# Patient Record
Sex: FEMALE | Race: WHITE | NOT HISPANIC OR LATINO | Employment: UNEMPLOYED | URBAN - METROPOLITAN AREA
[De-identification: names, ages, dates, MRNs, and addresses within clinical notes are randomized per-mention and may not be internally consistent; named-entity substitution may affect disease eponyms.]

---

## 2019-05-08 ENCOUNTER — OFFICE VISIT (OUTPATIENT)
Dept: URGENT CARE | Facility: CLINIC | Age: 44
End: 2019-05-08
Payer: COMMERCIAL

## 2019-05-08 VITALS
DIASTOLIC BLOOD PRESSURE: 82 MMHG | RESPIRATION RATE: 18 BRPM | HEART RATE: 95 BPM | SYSTOLIC BLOOD PRESSURE: 124 MMHG | OXYGEN SATURATION: 100 % | TEMPERATURE: 97.8 F | HEIGHT: 67 IN | WEIGHT: 148.6 LBS | BODY MASS INDEX: 23.32 KG/M2

## 2019-05-08 DIAGNOSIS — B30.9 ACUTE VIRAL CONJUNCTIVITIS OF RIGHT EYE: Primary | ICD-10-CM

## 2019-05-08 PROCEDURE — 99213 OFFICE O/P EST LOW 20 MIN: CPT | Performed by: FAMILY MEDICINE

## 2019-05-08 RX ORDER — DEXTROAMPHETAMINE SACCHARATE, AMPHETAMINE ASPARTATE MONOHYDRATE, DEXTROAMPHETAMINE SULFATE AND AMPHETAMINE SULFATE 5; 5; 5; 5 MG/1; MG/1; MG/1; MG/1
CAPSULE, EXTENDED RELEASE ORAL
Refills: 0 | COMMUNITY
Start: 2019-04-25

## 2019-05-08 RX ORDER — NORGESTREL-ETHINYL ESTRADIOL 0.3-0.03MG
1 TABLET ORAL DAILY
Refills: 3 | COMMUNITY
Start: 2019-03-23

## 2019-05-08 RX ORDER — POLYDIMETHYLSILOXANES/SILICON
GEL (GRAM) TOPICAL
Refills: 3 | COMMUNITY
Start: 2019-03-08 | End: 2020-09-14 | Stop reason: ALTCHOICE

## 2019-05-08 RX ORDER — FLUOXETINE 10 MG/1
30 CAPSULE ORAL DAILY
Refills: 3 | COMMUNITY
Start: 2019-03-19

## 2019-05-08 RX ORDER — BUPROPION HCL 300 MG
TABLET, EXTENDED RELEASE 24 HR ORAL
Refills: 0 | COMMUNITY
Start: 2019-04-29

## 2019-05-08 RX ORDER — KETOTIFEN FUMARATE 0.35 MG/ML
1 SOLUTION/ DROPS OPHTHALMIC 2 TIMES DAILY PRN
Qty: 5 ML | Refills: 0 | Status: SHIPPED | OUTPATIENT
Start: 2019-05-08 | End: 2020-09-14 | Stop reason: ALTCHOICE

## 2019-07-23 ENCOUNTER — OFFICE VISIT (OUTPATIENT)
Dept: URGENT CARE | Facility: CLINIC | Age: 44
End: 2019-07-23
Payer: COMMERCIAL

## 2019-07-23 VITALS
SYSTOLIC BLOOD PRESSURE: 114 MMHG | WEIGHT: 150.4 LBS | BODY MASS INDEX: 23.61 KG/M2 | TEMPERATURE: 98.1 F | DIASTOLIC BLOOD PRESSURE: 74 MMHG | HEIGHT: 67 IN | RESPIRATION RATE: 16 BRPM | OXYGEN SATURATION: 100 % | HEART RATE: 86 BPM

## 2019-07-23 DIAGNOSIS — H10.31 ACUTE CONJUNCTIVITIS OF RIGHT EYE, UNSPECIFIED ACUTE CONJUNCTIVITIS TYPE: Primary | ICD-10-CM

## 2019-07-23 PROCEDURE — 99213 OFFICE O/P EST LOW 20 MIN: CPT | Performed by: PHYSICIAN ASSISTANT

## 2019-07-23 RX ORDER — TOBRAMYCIN 3 MG/ML
1 SOLUTION/ DROPS OPHTHALMIC
Qty: 5 ML | Refills: 0 | Status: SHIPPED | COMMUNITY
Start: 2019-07-23 | End: 2019-08-15 | Stop reason: ALTCHOICE

## 2019-07-23 NOTE — PATIENT INSTRUCTIONS
Conjunctivitis   WHAT YOU SHOULD KNOW:   Conjunctivitis, or pink eye, is inflammation of your conjunctiva  The conjunctiva is a thin tissue that covers the front of your eye and the back of your eyelids  The conjunctiva helps protect your eye and keep it moist         INSTRUCTIONS:   Medicines:   · Allergy medicine: This medicine helps decrease itchy, red, swollen eyes caused by allergies  It may be given as a pill, eye drops, or nasal spray  · Antibiotics:  You will need antibiotics if your conjunctivitis is caused by bacteria  This medicine may be given as eye drops or eye ointment  · Steroid medicine: This medicine helps decrease inflammation  It may be given as a pill, eye drops, or nasal spray  · Take your medicine as directed  Call your healthcare provider if you think your medicine is not helping or if you have side effects  Tell him if you are allergic to any medicine  Keep a list of the medicines, vitamins, and herbs you take  Include the amounts, and when and why you take them  Bring the list or the pill bottles to follow-up visits  Carry your medicine list with you in case of an emergency  Follow up with your primary healthcare provider as directed: You may need to return for more tests on your eyes  These will help your primary healthcare provider check for eye damage  Write down your questions so you remember to ask them during your visits  Avoid the spread of conjunctivitis:   · Wash your hands often:  Wash your hands before you touch your eyes  Also wash your hands before you prepare or eat food and after you use the bathroom or change a diaper  · Avoid allergens:  Try to avoid the things that cause your allergies, such as pets, dust, or grass  · Avoid contact:  Do not share towels or washcloths  Try to stay away from others as much as possible  Ask when you can return to work or school  · Throw away eye makeup:  Throw away mascara and other eye makeup    Manage your symptoms:  · Apply a cool compress:  Wet a washcloth with cold water and place it on your eye  This will help decrease swelling  · Use eye drops:  Eye drops, or artificial tears, can be bought without a doctor's order  They help keep your eye moist     · Do not wear contact lenses: They can irritate your eye  Throw away the pair you are using and ask when you can wear them again  Use a new pair of lenses when your primary healthcare provider says it is okay  · Flush your eye:  You may need to flush your eye with saline to help decrease your symptoms  Ask for more information on how to flush your eye  Contact your primary healthcare provider if:   · Your eyesight becomes blurry  · You have tiny bumps or spots of blood on your eye  · You have questions or concerns about your condition or care  Return to the emergency department if:   · The swelling in your eye gets worse, even after treatment  · Your vision suddenly becomes worse or you cannot see at all  · Your eye begins to bleed  © 2014 [address] is for End User's use only and may not be sold, redistributed or otherwise used for commercial purposes  All illustrations and images included in CareNotes® are the copyrighted property of [employer]  or [employer]  The above information is an  only  It is not intended as medical advice for individual conditions or treatments  Talk to your doctor, nurse or pharmacist before following any medical regimen to see if it is safe and effective for you  Tobramycin (Into the eye)   Tobramycin (toe-bra-MYE-sin)  Treats eye infections  Belongs to a class of drugs called antibiotics  Brand Name(s): Tobrex   There may be other brand names for this medicine  When This Medicine Should Not Be Used: You should not use this medicine if you have had an allergic reaction to tobramycin or other related medicines, such as gentamicin (Garamycin®)  How to Use This Medicine:   Ointment, Drop  · Your doctor will tell you how much of this medicine to use and how often  Do not use more medicine or use it more often than your doctor tells you to  This medicine is not for long-term use  · Wash your hands before and after using the medicine  · Shake the eye drops well just before each use  · Lie down or tilt your head back  With your index finger, pull down the lower lid of your eye to form a pocket  · To use the eye drops: Hold the dropper close to your eye with the other hand  Drop the correct number of drops into the pocket made between your lower lid and eyeball  Gently close your eyes  Place your index finger over the inner corner of your eye for 1 minute  Do not rinse or wipe the dropper or allow it to touch anything, including your eye  Put the cap on the bottle right away  Keep the bottle upright when you are not using it  · To use the ointment: Hold the tip of the tube close to your eye with the other hand  Avoid touching the tip of the tube to your eye or finger  Squeeze a ribbon of ointment into the pocket between your lower lid and eyeball  Close your eyes for 1 to 2 minutes  Wipe the tip with a clean tissue and close the tube tightly  Keep the tube tightly closed when you are not using it  If a dose is missed:   · If you miss a dose or forget to use your medicine, use it as soon as you can  If it is almost time for your next dose, wait until then to use the medicine and skip the missed dose  · Do not use extra medicine to make up for a missed dose  How to Store and Dispose of This Medicine:   · Store the medicine at room temperature, away from heat and direct light  · Keep all medicine out of the reach of children and never share your medicine with anyone  Drugs and Foods to Avoid:   Ask your doctor or pharmacist before using any other medicine, including over-the-counter medicines, vitamins, and herbal products    · Make sure your doctor knows if you are using any other products for the eye  Warnings While Using This Medicine:   · Check with your doctor if your condition worsens, or does not get better while using tobramycin  Possible Side Effects While Using This Medicine:   Call your doctor right away if you notice any of these side effects:  · Eye irritation that was not there before using the medicine  If you notice these less serious side effects, talk with your doctor:   · Burning or stinging of the eye, tearing  · Blurred vision is common for the first few minutes after using  If it continues, talk with your doctor  If you notice other side effects that you think are caused by this medicine, tell your doctor  Call your doctor for medical advice about side effects  You may report side effects to FDA at 3-007-FDA-8532  © 2017 [address]  Information is for End User's use only and may not be sold, redistributed or otherwise used for commercial purposes  The above information is an  only  It is not intended as medical advice for individual conditions or treatments  Talk to your doctor, nurse or pharmacist before following any medical regimen to see if it is safe and effective for you  Conjunctivitis:   -There was no corneal abrasion seen on exam with fluorescin dye    -We discussed that as there is conjunctival erythema and mild discharge seen on exam this is likely a conjunctivitis  Will treat with Tobramycin eye drops to be applied  as directed  -Use saline eye drops and cool compress for comfort   -Avoid makeup until your symptoms improve   -If your symptoms worsen see your Eye Doctor immediately

## 2019-07-23 NOTE — PROGRESS NOTES
[facility]        NAME: Collins Almaraz is a 53 y o  female  : 1975    MRN: [id number]  DATE: 2019  TIME: 10:14 AM    Assessment and Plan   Acute conjunctivitis of right eye, unspecified acute conjunctivitis type [H10 31]  1  Acute conjunctivitis of right eye, unspecified acute conjunctivitis type  tobramycin (TOBREX) 0 3 % SOLN         Patient Instructions   Conjunctivitis:   -There was no corneal abrasion seen on exam with fluorescin dye    -We discussed that as there is conjunctival erythema and mild discharge seen on exam this is likely a conjunctivitis  Will treat with Tobramycin eye drops to be applied  as directed  -Use saline eye drops and cool compress for comfort   -Avoid makeup until your symptoms improve   -If your symptoms worsen see your Eye Doctor immediately  Follow up with PCP in 3-5 days  Proceed to  ER if symptoms worsen  Chief Complaint     Chief Complaint   Patient presents with    Eye Problem     pt here for a right eye issues, pt states on going since yesterday,  Pt states her eye feels  gritty,  red,  crusty in the corner this morning  History of Present Illness       Collins Almaraz is a 53year old female who presents today for right eye irritation  The patient states that yesterday she began to experience a foreign body sensation in her eye, she states that she applied allergy eye drops and used normal saline to flush the eye  She states that she has conjunctival erythema and further irritation when she woke up this morning  She denies fever, chills, discharge, exudate  She denies known trauma to the eye  She denies sick contacts or recent travel  She denies visual changes, photophobia, headache, dizziness  The patient denies contact lens use  Review of Systems   Review of Systems   Constitutional: Negative for activity change, appetite change, chills, diaphoresis, fatigue and fever     HENT: Negative for congestion, ear discharge, ear pain, facial swelling, hearing loss, postnasal drip, rhinorrhea, sinus pressure, sinus pain, sneezing, sore throat, tinnitus, trouble swallowing and voice change  Eyes: Positive for redness  Negative for photophobia, pain, discharge, itching and visual disturbance  Respiratory: Negative for cough, chest tightness, shortness of breath, wheezing and stridor  Cardiovascular: Negative for chest pain, palpitations and leg swelling  Gastrointestinal: Negative for abdominal pain, diarrhea, nausea and vomiting  Musculoskeletal: Negative for arthralgias, joint swelling, myalgias, neck pain and neck stiffness  Skin: Negative for rash  Allergic/Immunologic: Negative for immunocompromised state  Neurological: Negative for dizziness, weakness, light-headedness, numbness and headaches  Hematological: Negative for adenopathy           Current Medications       Current Outpatient Medications:     amphetamine-dextroamphetamine (ADDERALL XR) 20 MG 24 hr capsule, , Disp: , Rfl: 0    CRYSELLE-28 0 3-30 MG-MCG per tablet, Take 1 tablet by mouth daily, Disp: , Rfl: 3    FLUoxetine (PROzac) 10 mg capsule, Take 30 mg by mouth daily, Disp: , Rfl: 3    Scar Treatment Products (RECEDO) GEL, APPLY TO AFFECTED SCAR TWICE A DAY, Disp: , Rfl: 3    WELLBUTRIN  MG 24 hr tablet, , Disp: , Rfl: 0    ketotifen (ZADITOR) 0 025 % ophthalmic solution, Administer 1 drop to the right eye 2 (two) times a day as needed (Irritation) (Patient not taking: Reported on 7/23/2019), Disp: 5 mL, Rfl: 0    tobramycin (TOBREX) 0 3 % SOLN, Administer 1 drop to the right eye every 4 (four) hours while awake, Disp: 5 mL, Rfl: 0    Current Allergies     Allergies as of 07/23/2019    (No Known Allergies)            The following portions of the patient's history were reviewed and updated as appropriate: allergies, current medications, past family history, past medical history, past social history, past surgical history and problem list  Past Medical History:   Diagnosis Date    Depression        Past Surgical History:   Procedure Laterality Date    NO PAST SURGERIES         Family History   Problem Relation Age of Onset    Hypertension Mother     Diabetes Father          Medications have been verified  Objective   /74 (BP Location: Right arm, Patient Position: Sitting, Cuff Size: Standard)   Pulse 86   Temp 98 1 °F (36 7 °C) (Tympanic)   Resp 16   Ht 5' 7" (1 702 m)   Wt 68 2 kg (150 lb 6 4 oz)   SpO2 100%   BMI 23 56 kg/m²        Physical Exam     Physical Exam   Constitutional: She is oriented to person, place, and time  She appears well-developed and well-nourished  No distress  HENT:   Head: Normocephalic and atraumatic  Right Ear: Hearing, tympanic membrane, external ear and ear canal normal    Left Ear: Hearing, tympanic membrane, external ear and ear canal normal    Nose: Nose normal  No mucosal edema or rhinorrhea  Right sinus exhibits no maxillary sinus tenderness and no frontal sinus tenderness  Left sinus exhibits no maxillary sinus tenderness and no frontal sinus tenderness  Mouth/Throat: Uvula is midline, oropharynx is clear and moist and mucous membranes are normal  No uvula swelling  No oropharyngeal exudate, posterior oropharyngeal edema, posterior oropharyngeal erythema or tonsillar abscesses  Eyes: Pupils are equal, round, and reactive to light  EOM are normal  Right eye exhibits discharge  Right eye exhibits no chemosis, no exudate and no hordeolum  Foreign body (eyelash successfully removed ) present in the right eye  Left eye exhibits no chemosis, no discharge, no exudate and no hordeolum  No foreign body present in the left eye  Right conjunctiva is injected  Right conjunctiva has no hemorrhage  Left conjunctiva is not injected  Left conjunctiva has no hemorrhage  Right eye exhibits normal extraocular motion and no nystagmus  Left eye exhibits normal extraocular motion and no nystagmus  Right pupil is round and reactive  Left pupil is round and reactive  Pupils are equal    Slit lamp exam:       The right eye shows foreign body (eyelash removed )  The right eye shows no corneal abrasion, no corneal flare, no corneal ulcer, no hyphema, no hypopyon, no fluorescein uptake and no anterior chamber bulge  Neck: Normal range of motion  Neck supple  Cardiovascular: Normal rate, regular rhythm, S1 normal and S2 normal  Exam reveals no gallop, no S3, no S4, no distant heart sounds and no friction rub  No murmur heard  Pulmonary/Chest: No accessory muscle usage  No tachypnea and no bradypnea  No respiratory distress  She has no decreased breath sounds  She has no wheezes  She has no rhonchi  She has no rales  Lymphadenopathy:     She has no cervical adenopathy  Neurological: She is alert and oriented to person, place, and time  Skin: She is not diaphoretic  Psychiatric: She has a normal mood and affect  Her behavior is normal    Nursing note and vitals reviewed

## 2019-08-15 ENCOUNTER — APPOINTMENT (OUTPATIENT)
Dept: RADIOLOGY | Facility: CLINIC | Age: 44
End: 2019-08-15
Payer: COMMERCIAL

## 2019-08-15 ENCOUNTER — OFFICE VISIT (OUTPATIENT)
Dept: URGENT CARE | Facility: CLINIC | Age: 44
End: 2019-08-15
Payer: COMMERCIAL

## 2019-08-15 VITALS
HEIGHT: 67 IN | DIASTOLIC BLOOD PRESSURE: 80 MMHG | WEIGHT: 152 LBS | BODY MASS INDEX: 23.86 KG/M2 | OXYGEN SATURATION: 100 % | RESPIRATION RATE: 16 BRPM | HEART RATE: 89 BPM | SYSTOLIC BLOOD PRESSURE: 122 MMHG | TEMPERATURE: 98.3 F

## 2019-08-15 DIAGNOSIS — S69.92XA LEFT WRIST INJURY, INITIAL ENCOUNTER: ICD-10-CM
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
## 2019-08-15 DIAGNOSIS — S52.502A CLOSED FRACTURE OF DISTAL END OF LEFT RADIUS, UNSPECIFIED FRACTURE MORPHOLOGY, INITIAL ENCOUNTER: Primary | ICD-10-CM

## 2019-08-15 PROCEDURE — 73090 X-RAY EXAM OF FOREARM: CPT

## 2019-08-15 PROCEDURE — 73110 X-RAY EXAM OF WRIST: CPT

## 2019-08-15 PROCEDURE — 99213 OFFICE O/P EST LOW 20 MIN: CPT | Performed by: PHYSICIAN ASSISTANT

## 2019-08-15 NOTE — PATIENT INSTRUCTIONS
Wrist Fracture in Adults   WHAT YOU NEED TO KNOW:   A wrist fracture is a break in one or more of the bones in your wrist    DISCHARGE INSTRUCTIONS:   Return to the emergency department if:   · Your pain gets worse or does not get better after you take pain medicine  · Your cast or splint breaks, gets wet, or is damaged  · Your hand or fingers feel numb or cold  · Your hand or fingers turn white or blue  · Your splint or cast feels too tight  · You have more pain or swelling after the cast or splint is put on  Contact your healthcare provider if:   · You have a fever  · There is a foul smell or blood coming from under the cast     · You have questions or concerns about your condition or care  Medicines:   · Prescription pain medicine  may be given  Ask your healthcare provider how to take this medicine safely  Some prescription pain medicines contain acetaminophen  Do not take other medicines that contain acetaminophen without talking to your healthcare provider  Too much acetaminophen may cause liver damage  Prescription pain medicine may cause constipation  Ask your healthcare provider how to prevent or treat constipation  · NSAIDs , such as ibuprofen, help decrease swelling, pain, and fever  NSAIDs can cause stomach bleeding or kidney problems in certain people  If you take blood thinner medicine, always ask your healthcare provider if NSAIDs are safe for you  Always read the medicine label and follow directions  · Acetaminophen  decreases pain and fever  It is available without a doctor's order  Ask how much to take and how often to take it  Follow directions  Read the labels of all other medicines you are using to see if they also contain acetaminophen, or ask your doctor or pharmacist  Acetaminophen can cause liver damage if not taken correctly  Do not use more than 4 grams (4,000 milligrams) total of acetaminophen in one day  · Take your medicine as directed    Contact your healthcare provider if you think your medicine is not helping or if you have side effects  Tell him or her if you are allergic to any medicine  Keep a list of the medicines, vitamins, and herbs you take  Include the amounts, and when and why you take them  Bring the list or the pill bottles to follow-up visits  Carry your medicine list with you in case of an emergency  Self-care:   · Rest  as much as possible  Do not play contact sports until the healthcare provider says it is okay  · Apply ice  on your wrist for 15 to 20 minutes every hour or as directed  Use an ice pack, or put crushed ice in a plastic bag  Cover it with a towel before you place it on your skin  Ice helps prevent tissue damage and decreases swelling and pain  · Elevate  your wrist above the level of your heart as often as possible  This will help decrease swelling and pain  Prop your wrist on pillows or blankets to keep it elevated comfortably  Cast or splint care:   · You may take a bath or shower as directed  Do not let your cast or splint get wet  Before bathing, cover the cast or splint with 2 plastic trash bags  Tape the bags to your skin above the cast or splint to seal out the water  Keep your arm out of the water in case the bag breaks  If a plaster cast gets wet and soft, call your healthcare provider  · Check the skin around the cast or splint every day  You may put lotion on any red or sore areas  · Do not push down or lean on the cast or brace because it may break  · Do not  scratch the skin under the cast by putting a sharp or pointed object inside the cast   Go to physical therapy as directed: You may need physical therapy after your wrist heals and the cast is removed  A physical therapist can teach you exercises to help improve movement and strength and to decrease pain  Follow up with your healthcare provider or bone specialist as directed: You may need to return to have your cast removed   You may also need an x-ray to check how well the bone has healed  Write down your questions so you remember to ask them during your visits  © 2017 [address] Information is for End User's use only and may not be sold, redistributed or otherwise used for commercial purposes  All illustrations and images included in CareNotes® are the copyrighted property of [employer]  or [employer]  The above information is an  only  It is not intended as medical advice for individual conditions or treatments  Talk to your doctor, nurse or pharmacist before following any medical regimen to see if it is safe and effective for you

## 2019-08-15 NOTE — PROGRESS NOTES
Splint application  Date/Time: 8/15/2019 2:34 PM  Performed by: Diana Randall RN  Authorized by: Susan Toscano PA-C     Consent:     Consent obtained:  Verbal    Consent given by:  Patient    Risks discussed:  Discoloration, numbness, pain and swelling  Pre-procedure details:     Sensation:  Normal  Procedure details:     Laterality:  Left    Location:  Wrist    Wrist:  L wrist    Strapping: no      Supplies:  Cotton padding and Ortho-Glass  Post-procedure details:     Pain:  Improved    Sensation:  Normal    Patient tolerance of procedure:   Tolerated well, no immediate complications

## 2019-08-15 NOTE — PROGRESS NOTES
[facility]        NAME: Vikram Davis is a 53 y o  female  : 1975    MRN: [id number]  DATE: 2019  TIME: 9:49 AM    Assessment and Plan   Closed fracture of distal end of left radius, unspecified fracture morphology, initial encounter [S52 502A]  1  Closed fracture of distal end of left radius, unspecified fracture morphology, initial encounter  XR wrist 3+ vw left    XR forearm 2 vw left    Ambulatory referral to Orthopedic Surgery    Splint application   2  Fall down stairs, initial encounter  Ambulatory referral to Orthopedic Surgery         Patient Instructions     Discussed condition with pt  XR of left wrist and forearm reveals a distal radius fracture  She will be placed in splint and referred to orthopaedics  In the interim, I rec NSAIDs for pain and swelling  Follow up with PCP in 3-5 days  Proceed to  ER if symptoms worsen  Chief Complaint     Chief Complaint   Patient presents with    Wrist Injury     Pt states she fell on the steps last night at 8:30, pain with movement in left wrist ,   pain   7/10  Pt used Advil and ice  History of Present Illness       Pt presents after falling and injuring her left wrist/forearm yesterday  Around 8:30 PM, she was on the stairs, lost her balance, and fell onto an outstretched left hand to try to break her fall  Denies any other area of injury  She reports pain, swelling, bruising of the wrist/forearm  Has applied ice and taken Advil  Review of Systems   Review of Systems   Constitutional: Negative  Respiratory: Negative  Cardiovascular: Negative  Gastrointestinal: Negative  Genitourinary: Negative      Musculoskeletal:        Left wrist/forearm injury with [address] mechanism          Current Medications       Current Outpatient Medications:     amphetamine-dextroamphetamine (ADDERALL XR) 20 MG 24 hr capsule, , Disp: , Rfl: 0    CRYSELLE-28 0 3-30 MG-MCG per tablet, Take 1 tablet by mouth daily, Disp: , Rfl: 3    FLUoxetine (PROzac) 10 mg capsule, Take 30 mg by mouth daily, Disp: , Rfl: 3    ketotifen (ZADITOR) 0 025 % ophthalmic solution, Administer 1 drop to the right eye 2 (two) times a day as needed (Irritation), Disp: 5 mL, Rfl: 0    Scar Treatment Products (RECEDO) GEL, APPLY TO AFFECTED SCAR TWICE A DAY, Disp: , Rfl: 3    WELLBUTRIN  MG 24 hr tablet, , Disp: , Rfl: 0    Current Allergies     Allergies as of 08/15/2019    (No Known Allergies)            The following portions of the patient's history were reviewed and updated as appropriate: allergies, current medications, past family history, past medical history, past social history, past surgical history and problem list      Past Medical History:   Diagnosis Date    ADHD (attention deficit hyperactivity disorder)     Depression        Past Surgical History:   Procedure Laterality Date    NO PAST SURGERIES         Family History   Problem Relation Age of Onset    Hypertension Mother     Diabetes Father     No Known Problems Sister     No Known Problems Brother     No Known Problems Maternal Aunt     No Known Problems Maternal Uncle     No Known Problems Paternal Aunt     No Known Problems Paternal Uncle     No Known Problems Maternal Grandmother     No Known Problems Maternal Grandfather     No Known Problems Paternal Grandmother     No Known Problems Paternal Grandfather          Medications have been verified  Objective   /80 (BP Location: Right arm, Patient Position: Sitting, Cuff Size: Standard)   Pulse 89   Temp 98 3 °F (36 8 °C) (Tympanic)   Resp 16   Ht 5' 7" (1 702 m)   Wt 68 9 kg (152 lb)   SpO2 100%   BMI 23 81 kg/m²        Physical Exam     Physical Exam   Constitutional: She is oriented to person, place, and time  She appears well-developed and well-nourished  No distress     Musculoskeletal:   Left distal forearm volar aspect with localized STS, erythema, and TTP worsened with PROM of wrist which is limited secondary to pain/discomfort  Neurological: She is alert and oriented to person, place, and time  Psychiatric: She has a normal mood and affect  Vitals reviewed

## 2019-08-16 ENCOUNTER — OFFICE VISIT (OUTPATIENT)
Dept: OBGYN CLINIC | Facility: CLINIC | Age: 44
End: 2019-08-16
Payer: COMMERCIAL

## 2019-08-16 VITALS
WEIGHT: 152 LBS | HEART RATE: 100 BPM | DIASTOLIC BLOOD PRESSURE: 111 MMHG | SYSTOLIC BLOOD PRESSURE: 155 MMHG | BODY MASS INDEX: 23.86 KG/M2 | HEIGHT: 67 IN

## 2019-08-16 DIAGNOSIS — S52.532A CLOSED COLLES' FRACTURE OF LEFT RADIUS, INITIAL ENCOUNTER: ICD-10-CM
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
## 2019-08-16 PROCEDURE — 25600 CLTX DST RDL FX/EPHYS SEP WO: CPT | Performed by: ORTHOPAEDIC SURGERY

## 2019-08-16 PROCEDURE — 99203 OFFICE O/P NEW LOW 30 MIN: CPT | Performed by: ORTHOPAEDIC SURGERY

## 2019-08-16 NOTE — LETTER
August 16, 2019     Patient: See Harvey   YOB: 1975   Date of Visit: 8/16/2019       To Whom it May Concern:    See Harvey is under my professional care  She was seen in my office on 8/16/2019  She has a wrist injury and she cannot drive for 2 weeks  Follow up 1-2 weeks  If you have any questions or concerns, please don't hesitate to call           Sincerely,          Jose Gallo,

## 2019-08-16 NOTE — PROGRESS NOTES
Assessment/Plan:  1  Closed Colles' fracture of left radius, initial encounter  Ambulatory referral to Orthopedic Surgery   2  Fall down stairs, initial encounter  Ambulatory referral to Fred[facility] ABDULLAHI Bailonbelle Gupta has left-sided wrist pain consistent with a nondisplaced intra-articular distal radius fracture  This should heal well with conservative measures  We did place her in Exos cast today and she was instructed to leave the cast on at all times except for bathing  She will follow up in the office in 1 week for repeat evaluation and x-ray  If she remains stable she will be continued on conservative treatment for the next 4-6 weeks  Subjective:   Nilda Beasley is a 53 y o  female who presents to the office for evaluation for a left wrist injury  She tripped and fell on her outstretched left hand landing on the stairs 2 days ago  She had immediate pain and discomfort to the wrist and difficulty moving  She presented to urgent care 1 day ago where she had an x-ray showing a nondisplaced fracture of the distal radius  She was placed in a splint advised to follow up in our office today  She has pain today that is aching and throbbing and intermittent over the distal aspect of her wrist   It worsens with movement or supination improves with rest and immobilization  She has been icing  She does have some swelling  She denies any numbness and tingling into her hand or previous history of wrist fracture  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  Review of Systems   Constitutional: Negative for chills, fever and unexpected weight change  HENT: Negative for hearing loss, nosebleeds and sore throat  Eyes: Negative for pain, redness and visual disturbance  Respiratory: Negative for cough, shortness of breath and wheezing  Cardiovascular: Negative for chest pain, palpitations and leg swelling     Gastrointestinal: Negative for abdominal pain, nausea and vomiting  Endocrine: Negative for polydipsia and polyuria  Genitourinary: Negative for dysuria and hematuria  Musculoskeletal:        See HPI   Skin: Negative for rash and wound  Neurological: Negative for dizziness, numbness and headaches  Psychiatric/Behavioral: Negative for decreased concentration and suicidal ideas  The patient is not nervous/anxious            Past Medical History:   Diagnosis Date    ADHD (attention deficit hyperactivity disorder)     Depression        Past Surgical History:   Procedure Laterality Date    NO PAST SURGERIES         Family History   Problem Relation Age of Onset    Hypertension Mother     Diabetes Father     No Known Problems Sister     No Known Problems Brother     No Known Problems Maternal Aunt     No Known Problems Maternal Uncle     No Known Problems Paternal Aunt     No Known Problems Paternal Uncle     No Known Problems Maternal Grandmother     No Known Problems Maternal Grandfather     No Known Problems Paternal Grandmother     No Known Problems Paternal Grandfather        Social History     Occupational History    Not on file   Tobacco Use    Smoking status: Never Smoker    Smokeless tobacco: Never Used   Substance and Sexual Activity    Alcohol use: Yes     Frequency: Monthly or less     Drinks per session: 1 or 2    Drug use: Never    Sexual activity: Not on file         Current Outpatient Medications:     amphetamine-dextroamphetamine (ADDERALL XR) 20 MG 24 hr capsule, , Disp: , Rfl: 0    CRYSELLE-28 0 3-30 MG-MCG per tablet, Take 1 tablet by mouth daily, Disp: , Rfl: 3    FLUoxetine (PROzac) 10 mg capsule, Take 30 mg by mouth daily, Disp: , Rfl: 3    ketotifen (ZADITOR) 0 025 % ophthalmic solution, Administer 1 drop to the right eye 2 (two) times a day as needed (Irritation), Disp: 5 mL, Rfl: 0    Scar Treatment Products (RECEDO) GEL, APPLY TO AFFECTED SCAR TWICE A DAY, Disp: , Rfl: 3    WELLBUTRIN XL 300 MG 24 hr tablet, , Disp: , Rfl: 0    Allergies   Allergen Reactions    Dust Mite Extract        Objective:  Vitals:    08/16/19 1305   BP: (!) 163/116   Pulse: 86       Left Hand Exam     Tenderness   The patient is experiencing tenderness in the radial area  Range of Motion   Wrist   Extension: abnormal   Flexion: abnormal   Pronation: abnormal   Supination: abnormal     Other   Erythema: absent  Sensation: normal  Pulse: present            Physical Exam   Constitutional: She is oriented to person, place, and time  She appears well-developed and well-nourished  HENT:   Head: Normocephalic and atraumatic  Eyes: Pupils are equal, round, and reactive to light  Conjunctivae are normal    Neck: Normal range of motion  Neck supple  Cardiovascular: Normal rate and intact distal pulses  Pulmonary/Chest: Effort normal  No respiratory distress  Musculoskeletal:   As noted in HPI   Neurological: She is alert and oriented to person, place, and time  Skin: Skin is warm and dry  Psychiatric: She has a normal mood and affect  Her behavior is normal    Vitals reviewed  I have personally reviewed pertinent films in PACS and my interpretation is as follows: Three-view x-ray of the left wrist from 8/15/19  Demonstrates a nondisplaced intra-articular distal radius fracture  Fracture Treatment  Date/Time: 8/16/2019 1:18 PM  Performed by: Andry Trammell DO  Authorized by: Andry Trammell DO     Patient Location:  Clinic  Verbal consent obtained?: Yes    Consent given by:  Patient  Radiology Images displayed and confirmed  If images not available, report reviewed: Yes    Injury location:  Wrist  Location details:  Left wrist  Injury type:  Fracture  Fracture type: distal radius    Fracture type: distal radius    Local anesthesia used?: No    Manipulation performed?: No    Cast type:  Short arm  Supplies used: EXOS    Neurovascular status: Neurovascularly intact    Patient tolerance:  Patient tolerated the procedure well with no immediate complications

## 2019-08-16 NOTE — LETTER
August 16, 2019     Patient: Christiano Davenport   YOB: 1975   Date of Visit: 8/16/2019       To Whom it May Concern:    Christiano Davenport is under my professional care  She was seen in my office on 8/16/2019  Please put her gym membership on hold for 1 month due to her injury  If you have any questions or concerns, please don't hesitate to call           Sincerely,          Woodrow Peres, DO

## 2019-08-28 ENCOUNTER — APPOINTMENT (OUTPATIENT)
Dept: RADIOLOGY | Facility: CLINIC | Age: 44
End: 2019-08-28
Payer: COMMERCIAL

## 2019-08-28 ENCOUNTER — TELEPHONE (OUTPATIENT)
Dept: OBGYN CLINIC | Facility: HOSPITAL | Age: 44
End: 2019-08-28

## 2019-08-28 ENCOUNTER — OFFICE VISIT (OUTPATIENT)
Dept: OBGYN CLINIC | Facility: CLINIC | Age: 44
End: 2019-08-28

## 2019-08-28 VITALS
WEIGHT: 150 LBS | HEIGHT: 67 IN | SYSTOLIC BLOOD PRESSURE: 130 MMHG | DIASTOLIC BLOOD PRESSURE: 86 MMHG | BODY MASS INDEX: 23.54 KG/M2 | HEART RATE: 83 BPM

## 2019-08-28 DIAGNOSIS — S52.532D CLOSED COLLES' FRACTURE OF LEFT RADIUS WITH ROUTINE HEALING, SUBSEQUENT ENCOUNTER: Primary | ICD-10-CM

## 2019-08-28 DIAGNOSIS — S52.532A CLOSED COLLES' FRACTURE OF LEFT RADIUS, INITIAL ENCOUNTER: ICD-10-CM

## 2019-08-28 PROCEDURE — 99024 POSTOP FOLLOW-UP VISIT: CPT | Performed by: ORTHOPAEDIC SURGERY

## 2019-08-28 PROCEDURE — 73110 X-RAY EXAM OF WRIST: CPT

## 2019-08-28 NOTE — PROGRESS NOTES
Assessment/Plan:  1  Closed Colles' fracture of left radius with routine healing, subsequent encounter  XR wrist 3+ vw left     Endy Oneill is doing well and has a stable left wrist fracture on examination today  Her x-ray does not show any further displacement  She will continue in the Exos cast at this time  I will see her back in 3 weeks for repeat x-ray and evaluation  Subjective:   Vikram Davis is a 53 y o  female who presents to the office for follow-up for a nondisplaced left wrist fracture  She has been in a Exos cast for the last 10 days in her fracture occurred 2 weeks ago  She states that her pain is much improved and the swelling has gone down  She denies any new injury            Past Medical History:   Diagnosis Date    ADHD (attention deficit hyperactivity disorder)     Depression        Past Surgical History:   Procedure Laterality Date    NO PAST SURGERIES         Family History   Problem Relation Age of Onset    Hypertension Mother     Diabetes Father     No Known Problems Sister     No Known Problems Brother     No Known Problems Maternal Aunt     No Known Problems Maternal Uncle     No Known Problems Paternal Aunt     No Known Problems Paternal Uncle     No Known Problems Maternal Grandmother     No Known Problems Maternal Grandfather     No Known Problems Paternal Grandmother     No Known Problems Paternal Grandfather        Social History     Occupational History    Not on file   Tobacco Use    Smoking status: Never Smoker    Smokeless tobacco: Never Used   Substance and Sexual Activity    Alcohol use: Yes     Frequency: Monthly or less     Drinks per session: 1 or 2    Drug use: Never    Sexual activity: Not on file         Current Outpatient Medications:     amphetamine-dextroamphetamine (ADDERALL XR) 20 MG 24 hr capsule, , Disp: , Rfl: 0    CRYSELLE-28 0 3-30 MG-MCG per tablet, Take 1 tablet by mouth daily, Disp: , Rfl: 3    FLUoxetine (PROzac) 10 mg capsule, Take 30 mg by mouth daily, Disp: , Rfl: 3    ketotifen (ZADITOR) 0 025 % ophthalmic solution, Administer 1 drop to the right eye 2 (two) times a day as needed (Irritation), Disp: 5 mL, Rfl: 0    Scar Treatment Products (RECEDO) GEL, APPLY TO AFFECTED SCAR TWICE A DAY, Disp: , Rfl: 3    WELLBUTRIN  MG 24 hr tablet, , Disp: , Rfl: 0    Allergies   Allergen Reactions    Dust Mite Extract        Objective:  Vitals:    08/28/19 0852   BP: 130/86   Pulse: 83       Left Hand Exam     Tenderness   The patient is experiencing tenderness in the radial area  Other   Erythema: absent  Sensation: normal  Pulse: present            Physical Exam   Constitutional: She is oriented to person, place, and time  She appears well-developed and well-nourished  HENT:   Head: Normocephalic and atraumatic  Eyes: Pupils are equal, round, and reactive to light  Conjunctivae are normal    Neck: Normal range of motion  Neck supple  Cardiovascular: Normal rate and intact distal pulses  Pulmonary/Chest: Effort normal  No respiratory distress  Musculoskeletal:   As noted in HPI   Neurological: She is alert and oriented to person, place, and time  Skin: Skin is warm and dry  Psychiatric: She has a normal mood and affect  Her behavior is normal    Vitals reviewed  I have personally reviewed pertinent films in PACS and my interpretation is as follows: Three-view x-rays of the left wrist today demonstrates stable, intra-articular distal radius fracture

## 2019-08-28 NOTE — TELEPHONE ENCOUNTER
Patient advised she would like to start on the 9th (NOT 9/3/19) Is this okay? States her job is very hands on

## 2019-08-28 NOTE — TELEPHONE ENCOUNTER
Caller: Patient- Chino Yu  Call Back Number: [phone]  Provider: Dr Janay Glass    Patient has called to request for a work note  Patient would like the note to state she in under Dr Luz Alaniz care and would like to start on September 9th  Patient would like to receive a call so she may  the letter at office      Please advice, thank you

## 2019-08-28 NOTE — TELEPHONE ENCOUNTER
We discussed in the office today that she will return to work next week, not the ninth    Please write a note allowing her to return to work on 9/3/2019

## 2019-09-23 ENCOUNTER — OFFICE VISIT (OUTPATIENT)
Dept: OBGYN CLINIC | Facility: CLINIC | Age: 44
End: 2019-09-23

## 2019-09-23 ENCOUNTER — APPOINTMENT (OUTPATIENT)
Dept: RADIOLOGY | Facility: CLINIC | Age: 44
End: 2019-09-23
Payer: COMMERCIAL

## 2019-09-23 VITALS
WEIGHT: 150 LBS | DIASTOLIC BLOOD PRESSURE: 96 MMHG | SYSTOLIC BLOOD PRESSURE: 147 MMHG | HEART RATE: 97 BPM | HEIGHT: 67 IN | BODY MASS INDEX: 23.54 KG/M2

## 2019-09-23 DIAGNOSIS — M72.0 DUPUYTREN'S CONTRACTURE OF BOTH HANDS: ICD-10-CM

## 2019-09-23 DIAGNOSIS — S52.532D CLOSED COLLES' FRACTURE OF LEFT RADIUS WITH ROUTINE HEALING, SUBSEQUENT ENCOUNTER: Primary | ICD-10-CM

## 2019-09-23 DIAGNOSIS — S52.532D CLOSED COLLES' FRACTURE OF LEFT RADIUS WITH ROUTINE HEALING, SUBSEQUENT ENCOUNTER: ICD-10-CM

## 2019-09-23 PROCEDURE — 73110 X-RAY EXAM OF WRIST: CPT

## 2019-09-23 PROCEDURE — 99024 POSTOP FOLLOW-UP VISIT: CPT | Performed by: ORTHOPAEDIC SURGERY

## 2019-09-23 NOTE — LETTER
September 23, 2019     Patient: Sho Morris   YOB: 1975   Date of Visit: 9/23/2019       To Whom it May Concern:    Sho Morris is under my professional care  She was seen in my office on 9/23/2019  Please excuse her from her gym membership from 9/16/19 - 10/16/19  If you have any questions or concerns, please don't hesitate to call           Sincerely,          Bertin Johnson, DO

## 2019-09-23 NOTE — PROGRESS NOTES
Assessment/Plan:  1  Closed Colles' fracture of left radius with routine healing, subsequent encounter  XR wrist 3+ vw left   2  Dupuytren's contracture of both hands         Lashay Mckeon has a healing intra-articular distal radius fracture  I would like for her to begin with mobilization of this wrist to prevent stiffness  I also start her in physical therapy today  We did switch her out of the cast into a cock-up wrist splint that she can wear for stability when she is active  She should come out of it every day for gentle mobilization  She will continue to refrain from heavy lifting in the gym but can do all cardio exercises  I would like for her to follow up in 3-4 weeks for repeat evaluation  I will be out of the office likely at that time and would like for her to see Dr Ayde Orellana for quick x-ray and evaluation  Separately, she has a Dupuytren's contracture in both hands  I would like for her to follow-up with Dr Jose Alfredo Kim to consider Xiaflex injection  Subjective:   Riri Tom is a 53 y o  female who presents for follow-up for an intra-articular distal radius fracture in her left wrist   She has been in a Exos cast for 5 weeks and reports improvement in her pain and swelling  She denies any new injury  She states that she takes the Exos cast off occasionally for movement of her wrist and it does feel stiff but overall much improved  She also has complaint of Dupuytren's contracture in bilateral hands  She did present to another physician at one time who told her to leave this alone  She is wondering if anything can be done about this  It does bother her when lifting weights in the gym or picking up objects  Review of Systems   Constitutional: Negative for chills, fever and unexpected weight change  HENT: Negative for hearing loss, nosebleeds and sore throat  Eyes: Negative for pain, redness and visual disturbance  Respiratory: Negative for cough, shortness of breath and wheezing  Cardiovascular: Negative for chest pain, palpitations and leg swelling  Gastrointestinal: Negative for abdominal pain, nausea and vomiting  Endocrine: Negative for polydipsia and polyuria  Genitourinary: Negative for dysuria and hematuria  Musculoskeletal:        See HPI   Skin: Negative for rash and wound  Neurological: Negative for dizziness, numbness and headaches  Psychiatric/Behavioral: Negative for decreased concentration and suicidal ideas  The patient is not nervous/anxious            Past Medical History:   Diagnosis Date    ADHD (attention deficit hyperactivity disorder)     Depression        Past Surgical History:   Procedure Laterality Date    NO PAST SURGERIES         Family History   Problem Relation Age of Onset    Hypertension Mother     Diabetes Father     No Known Problems Sister     No Known Problems Brother     No Known Problems Maternal Aunt     No Known Problems Maternal Uncle     No Known Problems Paternal Aunt     No Known Problems Paternal Uncle     No Known Problems Maternal Grandmother     No Known Problems Maternal Grandfather     No Known Problems Paternal Grandmother     No Known Problems Paternal Grandfather        Social History     Occupational History    Not on file   Tobacco Use    Smoking status: Never Smoker    Smokeless tobacco: Never Used   Substance and Sexual Activity    Alcohol use: Yes     Frequency: Monthly or less     Drinks per session: 1 or 2    Drug use: Never    Sexual activity: Not on file         Current Outpatient Medications:     amphetamine-dextroamphetamine (ADDERALL XR) 20 MG 24 hr capsule, , Disp: , Rfl: 0    CRYSELLE-28 0 3-30 MG-MCG per tablet, Take 1 tablet by mouth daily, Disp: , Rfl: 3    FLUoxetine (PROzac) 10 mg capsule, Take 30 mg by mouth daily, Disp: , Rfl: 3    ketotifen (ZADITOR) 0 025 % ophthalmic solution, Administer 1 drop to the right eye 2 (two) times a day as needed (Irritation), Disp: 5 mL, Rfl: 0   Scar Treatment Products (RECEDO) GEL, APPLY TO AFFECTED SCAR TWICE A DAY, Disp: , Rfl: 3    WELLBUTRIN  MG 24 hr tablet, , Disp: , Rfl: 0    Allergies   Allergen Reactions    Dust Mite Extract        Objective:  Vitals:    09/23/19 0827   BP: 147/96   Pulse: 97       Left Hand Exam     Tenderness   Left hand tenderness location: Mild tenderness to palpation over dorsal radius at radiocarpal joint  Range of Motion   Wrist   Extension:  40 abnormal   Flexion:  70 abnormal   Pronation: normal   Supination: normal     Muscle Strength   Wrist extension: 4/5   Wrist flexion: 4/5   :  5/5     Other   Erythema: absent  Sensation: normal  Pulse: present    Comments:  Dupuytren's contracture in bilateral hands          Strength/Myotome Testing     Left Wrist/Hand   Wrist extension: 4  Wrist flexion: 4      Physical Exam   Constitutional: She is oriented to person, place, and time  She appears well-developed and well-nourished  HENT:   Head: Normocephalic and atraumatic  Eyes: Pupils are equal, round, and reactive to light  Conjunctivae are normal    Neck: Normal range of motion  Neck supple  Cardiovascular: Normal rate and intact distal pulses  Pulmonary/Chest: Effort normal  No respiratory distress  Neurological: She is alert and oriented to person, place, and time  Skin: Skin is warm and dry  Psychiatric: She has a normal mood and affect  Her behavior is normal        I have personally reviewed pertinent films in PACS and my interpretation is as follows: Three-view x-ray of the left wrist in the office today demonstrates a stable, healing intra-articular distal radius fracture

## 2019-09-26 ENCOUNTER — EVALUATION (OUTPATIENT)
Dept: PHYSICAL THERAPY | Facility: CLINIC | Age: 44
End: 2019-09-26
Payer: COMMERCIAL

## 2019-09-26 DIAGNOSIS — S52.532D CLOSED COLLES' FRACTURE OF LEFT RADIUS WITH ROUTINE HEALING, SUBSEQUENT ENCOUNTER: ICD-10-CM

## 2019-09-26 PROCEDURE — 97161 PT EVAL LOW COMPLEX 20 MIN: CPT

## 2019-09-26 NOTE — PROGRESS NOTES
PT Evaluation     Today's date: 2019  Patient name: Cheryl Harvey  : 1975  MRN: [id number]  Referring provider: Geo Luis DO  Dx:   Encounter Diagnosis     ICD-10-CM    1  Closed Colles' fracture of left radius with routine healing, subsequent encounter S52 [id number] Ambulatory referral to Physical Therapy       Start Time: 0800  Stop Time: 0845  Total time in clinic (min): 45 minutes    Assessment  Assessment details: Cheryl Harvey is a 53 y o  female who presents with pain, decreased strength, decreased ROM, decreased joint mobility and joint effusion  Due to these impairments, patient has difficulty performing ADL's, recreational activities, work-related activities, lifting/carrying, reaching  Patient's clinical presentation is consistent with their referring diagnosis of Closed Colles' fracture of left radius with routine healing, subsequent encounter Plan: Ambulatory referral to Physical Therapy  Patient has been educated in home exercise program and plan of care  Patient would benefit from skilled physical therapy services to address their aforementioned functional limitations and progress towards prior level of function and independence with home exercise program      Impairments: abnormal or restricted ROM, activity intolerance, impaired physical strength, lacks appropriate home exercise program and pain with function  Functional limitations: Unable to lift/carry with the L UEUnderstanding of Dx/Px/POC: good   Prognosis: good    Goals  Short Term Goals: Target Date 10/23/2019  1  Initiate and advance HEP  2  Increase left wrist AROM by at least 10 deg in all planes  3  Increase left UE MMT by at least 1/2 grade  4  Pt will be able to carry objects <5 lbs without increased pain      Long Term Goals: Target Date 2019  1  Indep with HEP  2  Increase left wrist AROM to [facility] in all planes  3  Increase left UE MMT to at least 4+/5 in all planes  4   Pt will be able to carry objects </=15 lbs without increased pain  5  Pt will be able to return to PLOF including resistance without pain or limitation        Plan  Patient would benefit from: skilled PT  Planned modality interventions: cryotherapy, electrical stimulation/Russian stimulation and thermotherapy: hydrocollator packs  Planned therapy interventions: joint mobilization, manual therapy, patient education, postural training, activity modification, abdominal trunk stabilization, body mechanics training, flexibility, functional ROM exercises, graded exercise, home exercise program, neuromuscular re-education, strengthening, stretching, therapeutic activities, therapeutic exercise, motor coordination training, muscle pump exercises, balance/weight bearing training and ADL training  Frequency: 3x week  Duration in weeks: 8  Plan of Care beginning date: 2019  Plan of Care expiration date: 2019  Treatment plan discussed with: patient        Subjective Evaluation    History of Present Illness  Mechanism of injury: Pt reports she fell down three stairs on August 15 and landed on an outstretched L UE  Went to Urgent Care the next day who performed x-rays (L Colles Fracture), and was referred to Dr Robert You the next day  She was given an Exos splint to be worn at all times for 4 weeks  States over that time, the pain has improved greatly as well as a marked reduction in swelling  Had a f/u with Dr Robert You 19 who took repeat x-rays (stable, healing intra-articular distal radius fracture ), gave her a cock-up splint and prescribed PT for mobility  Quality of life: good    Pain  Current pain ratin  At best pain rating: 3  At worst pain ratin (Reaching/grabbing)  Location: L Wrist  Quality: dull ache  Relieving factors: rest and ice  Exacerbated by: Reacing, grabbing light objects    Progression: improved    Social Support    Employment status: working  Hand dominance: left      Diagnostic Tests  X-ray: abnormal (As above)  Treatments  Previous treatment: immobilization  Patient Goals  Patient goals for therapy: return to sport/leisure activities, increased motion, decreased pain and increased strength  Patient goal: Return to weight lifting        Objective     Static Posture     Comments  Pt presents with a L wrist cock-up splint    Tenderness     Left Wrist/Hand   Tenderness in the distal radioulnar joint       Active Range of Motion   Left Shoulder   Normal active range of motion    Right Shoulder   Normal active range of motion    Left Elbow   Normal active range of motion    Right Elbow   Normal active range of motion    Left Wrist   Wrist flexion: 25 degrees   Wrist extension: 29 degrees   Radial deviation: 21 degrees   Ulnar deviation: 18 degrees     Right Wrist   Normal active range of motion    Passive Range of Motion     Left Wrist   Wrist flexion: 36 degrees   Wrist extension: 45 degrees   Radial deviation: 30 degrees   Ulnar deviation: 25 degrees     Right Wrist   Normal passive range of motion    Strength/Myotome Testing     Left Shoulder   Normal muscle strength    Right Shoulder   Normal muscle strength    Left Elbow   Flexion: 4  Extension: 4    Right Elbow   Flexion: 5  Extension: 5    Left Wrist/Hand   Wrist extension: 3  Wrist flexion: 3  Radial deviation: 3-  Ulnar deviation: 3     (2nd hand position)     Trial 1: 29    Trial 2: 32    Trial 3: 38    Right Wrist/Hand      (2nd hand position)     Trial 1: 56    Trial 2: 74    Trial 3: 71    Swelling     Left Wrist/Hand   Circumference wrist: 26 25 cm    Right Wrist/Hand   Circumference wrist: 25 5 cm      Flowsheet Rows      Most Recent Value   PT/OT G-Codes   Current Score  50   Projected Score  70   FOTO information reviewed  Yes   Assessment Type  Evaluation             Precautions: Standard    Specialty Daily Treatment Diary       Manual 9/26/19       PROM wrist        Wrist mobs        STM                                Exercise Diary         AROM flex/ext 10x5"ea       AROM rad/ ulnar dev 10x5"ea       Ball squeeze 10x5"       Finger abd band 10x5"                                                                                                               Modalities

## 2019-09-30 ENCOUNTER — APPOINTMENT (OUTPATIENT)
Dept: PHYSICAL THERAPY | Facility: CLINIC | Age: 44
End: 2019-09-30
Payer: COMMERCIAL

## 2019-10-01 ENCOUNTER — OFFICE VISIT (OUTPATIENT)
Dept: PHYSICAL THERAPY | Facility: CLINIC | Age: 44
End: 2019-10-01
Payer: COMMERCIAL

## 2019-10-01 DIAGNOSIS — S52.532D CLOSED COLLES' FRACTURE OF LEFT RADIUS WITH ROUTINE HEALING, SUBSEQUENT ENCOUNTER: Primary | ICD-10-CM

## 2019-10-01 PROCEDURE — 97110 THERAPEUTIC EXERCISES: CPT

## 2019-10-01 PROCEDURE — 97140 MANUAL THERAPY 1/> REGIONS: CPT

## 2019-10-01 NOTE — PROGRESS NOTES
Daily Note     Today's date: 10/1/2019  Patient name: Kat Ann  : 1975  MRN: [id number]  Referring provider: Durga Berkowitz DO  Dx:   Encounter Diagnosis   Name Primary?  Closed Colles' fracture of left radius with routine healing, subsequent encounter Yes       Start Time: 845  Stop Time: 940  Total time in clinic (min): 55 minutes    Subjective: Pt reports she has been doing well with HEP  Reports of improved wrist mobility and has been trying to remove her brace more often while at home  Pain Ratin/10    Objective: See treatment diary below  Precautions: Standard    Specialty Daily Treatment Diary       Manual 19       PROM wrist All planes       Wrist mobs Gr II-III       STM Retrograde                               Exercise Diary         AROM flex/ext 15x5"ea       AROM rad/ ulnar dev 10x5"ea       Gripper 10x5" ea digit yellow       Finger abd band        Wrist pro/sup 15x5" ea red therabar       Wrist circles x15 CW/CCW                                                                                               Modalities        MHP x8 mins pre                   Assessment: Pt reports of mild tightness/soreness with end range wrist flexion, denies pain with all other motions  Discussed carrying nothing heavier than 10 lbs at this time to prevent excessive stress to the bone  Plan: Continue per plan of care  Progress treatment as tolerated

## 2019-10-02 ENCOUNTER — APPOINTMENT (OUTPATIENT)
Dept: PHYSICAL THERAPY | Facility: CLINIC | Age: 44
End: 2019-10-02
Payer: COMMERCIAL

## 2019-10-03 ENCOUNTER — OFFICE VISIT (OUTPATIENT)
Dept: PHYSICAL THERAPY | Facility: CLINIC | Age: 44
End: 2019-10-03
Payer: COMMERCIAL

## 2019-10-03 DIAGNOSIS — S52.532D CLOSED COLLES' FRACTURE OF LEFT RADIUS WITH ROUTINE HEALING, SUBSEQUENT ENCOUNTER: Primary | ICD-10-CM

## 2019-10-03 PROCEDURE — 97110 THERAPEUTIC EXERCISES: CPT

## 2019-10-03 PROCEDURE — 97140 MANUAL THERAPY 1/> REGIONS: CPT

## 2019-10-03 NOTE — PROGRESS NOTES
Daily Note     Today's date: 10/3/2019  Patient name: Dari Ferrell  : 1975  MRN: [id number]  Referring provider: Stephen Whitman DO  Dx:   Encounter Diagnosis   Name Primary?  Closed Colles' fracture of left radius with routine healing, subsequent encounter Yes       Start Time: 930  Stop Time: 1020  Total time in clinic (min): 50 minutes    Subjective: Pt reports her wrist has been feeling great  Notices improvement in AROM in all planes  Mild soreness/throbbing when she is being more active  Pain Ratin/10    Objective: See treatment diary below  Precautions: Standard    Specialty Daily Treatment Diary       Manual 9/26/19 10/3/19      PROM wrist All planes All planes      Wrist mobs Gr II-III Gr II-III      STM Retrograde Retrograde                              Exercise Diary         AROM flex/ext 15x5"ea 10x10"ea      AROM rad/ ulnar dev 10x5"ea 10x5"ea      Gripper 10x5" ea digit yellow 10x5" ea digit yellow      Finger abd band        Wrist pro/sup 15x5" ea red therabar 15x5" ea green therabar      Wrist circles x15 CW/CCW x15 CW/CCW      Suitcase carry  3x50ft 5lbs                                                                                      Modalities        MHP x8 mins pre x8 mins pre                    Assessment: Pt denies pain with 5 lbs carries  Reports of mild generalozed soreness at EOS  Advised to ice as needed  Discussed light UE exercise to perform at gym, as long as she is not holding any weight or performing push/pull machines with handles  Plan: Continue per plan of care  Progress treatment as tolerated

## 2019-10-07 ENCOUNTER — APPOINTMENT (OUTPATIENT)
Dept: PHYSICAL THERAPY | Facility: CLINIC | Age: 44
End: 2019-10-07
Payer: COMMERCIAL

## 2019-10-09 ENCOUNTER — APPOINTMENT (OUTPATIENT)
Dept: PHYSICAL THERAPY | Facility: CLINIC | Age: 44
End: 2019-10-09
Payer: COMMERCIAL

## 2019-10-10 ENCOUNTER — OFFICE VISIT (OUTPATIENT)
Dept: PHYSICAL THERAPY | Facility: CLINIC | Age: 44
End: 2019-10-10
Payer: COMMERCIAL

## 2019-10-10 DIAGNOSIS — S52.532D CLOSED COLLES' FRACTURE OF LEFT RADIUS WITH ROUTINE HEALING, SUBSEQUENT ENCOUNTER: Primary | ICD-10-CM

## 2019-10-10 PROCEDURE — 97140 MANUAL THERAPY 1/> REGIONS: CPT

## 2019-10-10 PROCEDURE — 97110 THERAPEUTIC EXERCISES: CPT

## 2019-10-10 NOTE — PROGRESS NOTES
Daily Note     Today's date: 10/10/2019  Patient name: Kyle Puente  : 1975  MRN: [id number]  Referring provider: Andree Baltazar DO  Dx:   Encounter Diagnosis   Name Primary?  Closed Colles' fracture of left radius with routine healing, subsequent encounter Yes       Start Time: 0800  Stop Time: 0850  Total time in clinic (min): 50 minutes    Subjective: Pt reports her wrist has been feeling good, has been wearing the brace less frequently  Pain Ratin/10    Objective: See treatment diary below  Precautions: Standard    Specialty Daily Treatment Diary       Manual 9/26/19 10/3/19 10/10/19     PROM wrist All planes All planes All planes     Wrist mobs Gr II-III Gr II-III Gr II-III     STM Retrograde Retrograde Retrograde                             Exercise Diary         AROM flex/ext 15x5"ea 10x10"ea 10x5" w/overpressure     AROM rad/ ulnar dev 10x5"ea 10x5"ea 10x5" w/overpressure     Gripper 10x5" ea digit yellow 10x5" ea digit yellow 10x5" /10x5" ea digit red     Finger abd band        Wrist pro/sup 15x5" ea red therabar 15x5" ea green therabar 15x5" ea green therabar     Wrist circles x15 CW/CCW x15 CW/CCW      Suitcase carry  3x50ft 5lbs 4x50ft 7lbs     Therabar bends   Pro/sup 15x5" ea     Wrist ext stretch   Table 3x10"                                                                     Modalities        MHP x8 mins pre x8 mins pre x8 mins pre                   Assessment: Tolerated all activity without c/o pain  Added wrist extension stretch to HEP  Reminded pt to refrain from heavy lifting/carrying  Plan: Continue per plan of care  Progress treatment as tolerated

## 2019-10-14 ENCOUNTER — APPOINTMENT (OUTPATIENT)
Dept: PHYSICAL THERAPY | Facility: CLINIC | Age: 44
End: 2019-10-14
Payer: COMMERCIAL

## 2019-10-15 ENCOUNTER — OFFICE VISIT (OUTPATIENT)
Dept: PHYSICAL THERAPY | Facility: CLINIC | Age: 44
End: 2019-10-15
Payer: COMMERCIAL

## 2019-10-15 DIAGNOSIS — S52.532D CLOSED COLLES' FRACTURE OF LEFT RADIUS WITH ROUTINE HEALING, SUBSEQUENT ENCOUNTER: Primary | ICD-10-CM

## 2019-10-15 PROCEDURE — 97140 MANUAL THERAPY 1/> REGIONS: CPT

## 2019-10-15 PROCEDURE — 97110 THERAPEUTIC EXERCISES: CPT

## 2019-10-15 NOTE — PROGRESS NOTES
Daily Note     Today's date: 10/15/2019  Patient name: May Hugo  : 1975  MRN: [id number]  Referring provider: Mimi Loera DO  Dx:   Encounter Diagnosis   Name Primary?  Closed Colles' fracture of left radius with routine healing, subsequent encounter Yes       Start Time: 845  Stop Time: 935  Total time in clinic (min): 50 minutes    Subjective: Pt reports her wrist feels good, has bee noticing improvement in mobility and decreased soreness  Pain Ratin/10    Objective: See treatment diary below  Precautions: Standard    Specialty Daily Treatment Diary       Manual 9/26/19 10/3/19 10/10/19 10/15/19    PROM wrist All planes All planes All planes All planes    Wrist mobs Gr II-III Gr II-III Gr II-III Gr II-III    STM Retrograde Retrograde Retrograde Retrograde                            Exercise Diary         AROM flex/ext 15x5"ea 10x10"ea 10x5" w/overpressure 10x5" w/overpressure    AROM rad/ ulnar dev 10x5"ea 10x5"ea 10x5" w/overpressure 10x5"    Gripper 10x5" ea digit yellow 10x5" ea digit yellow 10x5" /10x5" ea digit red 10x5" /10x5" ea digit red    Finger abd band        Wrist pro/sup 15x5" ea red therabar 15x5" ea green therabar 15x5" ea green therabar 15x5" ea green therabar    Wrist circles x15 CW/CCW x15 CW/CCW      Suitcase carry  3x50ft 5lbs 4x50ft 7lbs 4x50ft 10 lbs    Therabar bends   Pro/sup 15x5" ea Pro/sup 15x5" ea green    Wrist ext stretch   Table 3x10" Table 5x10"    Scaption/abd    2x10 ea 2lbs    Elbow flex    2x15 2lbs    Tubing row    NV                                            Modalities        MHP x8 mins pre x8 mins pre x8 mins pre x8 mins pre                Assessment: Pt tolerated light strengthening activity without c/o  Demo near full AROM compared to R UE in all planes  Plan: Continue per plan of care  Progress treatment as tolerated

## 2019-10-16 ENCOUNTER — APPOINTMENT (OUTPATIENT)
Dept: PHYSICAL THERAPY | Facility: CLINIC | Age: 44
End: 2019-10-16
Payer: COMMERCIAL

## 2019-10-17 ENCOUNTER — OFFICE VISIT (OUTPATIENT)
Dept: PHYSICAL THERAPY | Facility: CLINIC | Age: 44
End: 2019-10-17
Payer: COMMERCIAL

## 2019-10-17 DIAGNOSIS — S52.532D CLOSED COLLES' FRACTURE OF LEFT RADIUS WITH ROUTINE HEALING, SUBSEQUENT ENCOUNTER: Primary | ICD-10-CM

## 2019-10-17 PROCEDURE — 97140 MANUAL THERAPY 1/> REGIONS: CPT

## 2019-10-17 PROCEDURE — 97110 THERAPEUTIC EXERCISES: CPT

## 2019-10-17 NOTE — PROGRESS NOTES
Daily Note     Today's date: 10/17/2019  Patient name: Kat Ann  : 1975  MRN: [id number]  Referring provider: Durga Berkowitz DO  Dx:   Encounter Diagnosis   Name Primary?  Closed Colles' fracture of left radius with routine healing, subsequent encounter Yes                  Subjective: Pt reports 0/10 pain in wrist  States she feels her mobility is improving  Objective: See treatment diary below    Precautions: Standard    Specialty Daily Treatment Diary       Manual 9/26/19 10/3/19 10/10/19 10/15/19 10/17/19   PROM wrist All planes All planes All planes All planes All planes   Wrist mobs Gr II-III Gr II-III Gr II-III Gr II-III Gr II-III   STM Retrograde Retrograde Retrograde Retrograde Retrograde                           Exercise Diary         AROM flex/ext 15x5"ea 10x10"ea 10x5" w/overpressure 10x5" w/overpressure 10x5" w/overpressure   AROM rad/ ulnar dev 10x5"ea 10x5"ea 10x5" w/overpressure 10x5" 10x5"   Gripper 10x5" ea digit yellow 10x5" ea digit yellow 10x5" /10x5" ea digit red 10x5" /10x5" ea digit red 20x5" green /10x5" ea digit red   Finger abd band        Wrist pro/sup 15x5" ea red therabar 15x5" ea green therabar 15x5" ea green therabar 15x5" ea green therabar 15x5" ea green therabar   Wrist circles x15 CW/CCW x15 CW/CCW      Suitcase carry  3x50ft 5lbs 4x50ft 7lbs 4x50ft 10 lbs 5x50ft 10 lbs   Therabar bends   Pro/sup 15x5" ea Pro/sup 15x5" ea green Pro/sup 15x5" ea green   Wrist ext stretch   Table 3x10" Table 5x10"    Scaption/abd    2x10 ea 2lbs 2x10 ea 2lbs   Elbow flex    2x15 2lbs 2x15 2lbs   Tubing row    NV Yellow, Hold 5, 2x10                                            Modalities        MHP x8 mins pre x8 mins pre x8 mins pre x8 mins pre x8 mins pre               Assessment: Pt denies any pain with treatment and tolerated well  Fatigues with gripping activities and requires frequent rests  Minimal TTP over distal radius with STM           Plan: Continue with plan of care to decrease pain, improve mobility, strength, and function

## 2019-10-21 ENCOUNTER — OFFICE VISIT (OUTPATIENT)
Dept: PHYSICAL THERAPY | Facility: CLINIC | Age: 44
End: 2019-10-21
Payer: COMMERCIAL

## 2019-10-21 ENCOUNTER — APPOINTMENT (OUTPATIENT)
Dept: RADIOLOGY | Facility: CLINIC | Age: 44
End: 2019-10-21
Payer: COMMERCIAL

## 2019-10-21 ENCOUNTER — OFFICE VISIT (OUTPATIENT)
Dept: OBGYN CLINIC | Facility: CLINIC | Age: 44
End: 2019-10-21

## 2019-10-21 VITALS
HEIGHT: 67 IN | SYSTOLIC BLOOD PRESSURE: 122 MMHG | HEART RATE: 90 BPM | WEIGHT: 140 LBS | BODY MASS INDEX: 21.97 KG/M2 | DIASTOLIC BLOOD PRESSURE: 85 MMHG

## 2019-10-21 DIAGNOSIS — S52.532D CLOSED COLLES' FRACTURE OF LEFT RADIUS WITH ROUTINE HEALING, SUBSEQUENT ENCOUNTER: ICD-10-CM

## 2019-10-21 DIAGNOSIS — S52.532D CLOSED COLLES' FRACTURE OF LEFT RADIUS WITH ROUTINE HEALING, SUBSEQUENT ENCOUNTER: Primary | ICD-10-CM

## 2019-10-21 PROCEDURE — 97140 MANUAL THERAPY 1/> REGIONS: CPT

## 2019-10-21 PROCEDURE — 73110 X-RAY EXAM OF WRIST: CPT

## 2019-10-21 PROCEDURE — 99024 POSTOP FOLLOW-UP VISIT: CPT | Performed by: ORTHOPAEDIC SURGERY

## 2019-10-21 PROCEDURE — 97110 THERAPEUTIC EXERCISES: CPT

## 2019-10-21 NOTE — LETTER
October 21, 2019     Patient: Malinda Azul   YOB: 1975   Date of Visit: 10/21/2019       To Whom it May Concern:    Malinda Azul is under my professional care  She was seen in my office on 10/21/2019  Please continue to excuse her from her gym membership until 11/1/19  She may then resume all activity without restriction on 11/1/19  If you have any questions or concerns, please don't hesitate to call           Sincerely,          Diana Peters, DO

## 2019-10-21 NOTE — PROGRESS NOTES
Assessment/Plan:  1  Closed Colles' fracture of left radius with routine healing, subsequent encounter  XR wrist 3+ vw left     Heladio Balbuena is doing well following her left wrist fracture  She has significant healing on x-ray and stable examination today  I would like for her to finish physical therapy over the next 2 weeks  At that time she can then resume her activity and exercise in the gym as tolerated  Subjective:   Rajiv Burciaga is a 53 y o  female who presents to the office for follow-up for a left wrist fracture  She was initially treated with a cast followed by a splint and has been doing physical therapy  She is 2 months out from her fracture  She states that 2 weeks ago she stop using the splint and has been moving her wrist normally  She denies any pain  She is hesitant to return to weight lifting and exercising with any weight-bearing in the left wrist until she has had full clearance  She states the only discomfort she feels as if she takes her wrist into full extension           Past Medical History:   Diagnosis Date    ADHD (attention deficit hyperactivity disorder)     Depression        Past Surgical History:   Procedure Laterality Date    NO PAST SURGERIES         Family History   Problem Relation Age of Onset    Hypertension Mother     Diabetes Father     No Known Problems Sister     No Known Problems Brother     No Known Problems Maternal Aunt     No Known Problems Maternal Uncle     No Known Problems Paternal Aunt     No Known Problems Paternal Uncle     No Known Problems Maternal Grandmother     No Known Problems Maternal Grandfather     No Known Problems Paternal Grandmother     No Known Problems Paternal Grandfather        Social History     Occupational History    Not on file   Tobacco Use    Smoking status: Never Smoker    Smokeless tobacco: Never Used   Substance and Sexual Activity    Alcohol use: Yes     Frequency: Monthly or less     Drinks per session: 1 or 2  Drug use: Never    Sexual activity: Not on file         Current Outpatient Medications:     amphetamine-dextroamphetamine (ADDERALL XR) 20 MG 24 hr capsule, , Disp: , Rfl: 0    CRYSELLE-28 0 3-30 MG-MCG per tablet, Take 1 tablet by mouth daily, Disp: , Rfl: 3    FLUoxetine (PROzac) 10 mg capsule, Take 30 mg by mouth daily, Disp: , Rfl: 3    ketotifen (ZADITOR) 0 025 % ophthalmic solution, Administer 1 drop to the right eye 2 (two) times a day as needed (Irritation), Disp: 5 mL, Rfl: 0    Scar Treatment Products (RECEDO) GEL, APPLY TO AFFECTED SCAR TWICE A DAY, Disp: , Rfl: 3    WELLBUTRIN  MG 24 hr tablet, , Disp: , Rfl: 0    Allergies   Allergen Reactions    Dust Mite Extract        Objective:  Vitals:    10/21/19 0837   BP: 122/85   Pulse: 90       Left Hand Exam     Tenderness   The patient is experiencing no tenderness  Range of Motion   The patient has normal left wrist ROM  Wrist   Extension: normal     Muscle Strength   The patient has normal left wrist strength  Other   Erythema: absent  Sensation: normal  Pulse: present          Strength/Myotome Testing     Left Wrist/Hand   Normal wrist strength      Physical Exam   Constitutional: She is oriented to person, place, and time  She appears well-developed and well-nourished  HENT:   Head: Normocephalic and atraumatic  Eyes: Pupils are equal, round, and reactive to light  Conjunctivae are normal    Neck: Normal range of motion  Neck supple  Cardiovascular: Normal rate and intact distal pulses  Pulmonary/Chest: Effort normal  No respiratory distress  Neurological: She is alert and oriented to person, place, and time  Skin: Skin is warm and dry  Psychiatric: She has a normal mood and affect  Her behavior is normal          I have personally reviewed pertinent films in PACS and my interpretation is as follows: Three-view x-ray of the left wrist in the office today demonstrates a healing, stable distal radius fracture

## 2019-10-21 NOTE — PROGRESS NOTES
Daily Note     Today's date: 10/21/2019  Patient name: Isaiah Starks  : 1975  MRN: [id number]  Referring provider: Corina Morrison DO  Dx:   Encounter Diagnosis   Name Primary?  Closed Colles' fracture of left radius with routine healing, subsequent encounter Yes                  Subjective: Pt reports she had a follow up with Dr Wendy Baltazar were taken and she was told that there is good healing in wrist  She can resume normal activities and return to the gym  Pain is currently rated 0/10  States she has a time restriction today  Objective: See treatment diary below    Precautions: Standard    Specialty Daily Treatment Diary       Manual 10/21  10/10/19 10/15/19 10/17/19   PROM wrist All planes  All planes All planes All planes   Wrist mobs Gr II-III  Gr II-III Gr II-III Gr II-III   STM Retrograde  Retrograde Retrograde Retrograde                           Exercise Diary         AROM flex/ext 10x5" w/overpressure  10x5" w/overpressure 10x5" w/overpressure 10x5" w/overpressure   AROM rad/ ulnar dev   10x5" w/overpressure 10x5" 10x5"   Gripper 20x5" green /10x5" ea digit red  10x5" /10x5" ea digit red 10x5" /10x5" ea digit red 20x5" green /10x5" ea digit red   Finger abd band        Wrist pro/sup 15x5" ea The stick  15x5" ea green therabar 15x5" ea green therabar 15x5" ea green therabar   Wrist circles        Suitcase carry   4x50ft 7lbs 4x50ft 10 lbs 5x50ft 10 lbs   Therabar bends Pro/sup 15x5" ea green  Pro/sup 15x5" ea Pro/sup 15x5" ea green Pro/sup 15x5" ea green   Wrist ext stretch   Table 3x10" Table 5x10"    Scaption/abd 2x10 ea 2lbs   2x10 ea 2lbs 2x10 ea 2lbs   Elbow flex 2x15 2lbs   2x15 2lbs 2x15 2lbs   Tubing row    NV Yellow, Hold 5, 2x10                                            Modalities        MHP NT   x8 mins pre x8 mins pre x8 mins pre               Assessment: Pt tolerated treatment well with no complaints of pain   Pt continues to demonstrate restrictions into wrist extension, full in all another directions  Pt's treatment modified secondary to pt's time constraint  Plan: Continue with plan of care to decrease pain, improve mobility, strength, and function

## 2019-10-22 ENCOUNTER — APPOINTMENT (OUTPATIENT)
Dept: PHYSICAL THERAPY | Facility: CLINIC | Age: 44
End: 2019-10-22
Payer: COMMERCIAL

## 2019-10-24 ENCOUNTER — OFFICE VISIT (OUTPATIENT)
Dept: PHYSICAL THERAPY | Facility: CLINIC | Age: 44
End: 2019-10-24
Payer: COMMERCIAL

## 2019-10-24 DIAGNOSIS — S52.532D CLOSED COLLES' FRACTURE OF LEFT RADIUS WITH ROUTINE HEALING, SUBSEQUENT ENCOUNTER: Primary | ICD-10-CM

## 2019-10-24 PROCEDURE — 97140 MANUAL THERAPY 1/> REGIONS: CPT

## 2019-10-24 PROCEDURE — 97110 THERAPEUTIC EXERCISES: CPT

## 2019-10-24 NOTE — PROGRESS NOTES
Daily Note      Today's date: 10/24/2019  Patient name: Yamile Tripp  : 1975  MRN: [id number]  Referring provider: Natasha Beth DO  Dx:   Encounter Diagnosis     ICD-10-CM    1  Closed Colles' fracture of left radius with routine healing, subsequent encounter S52 532D        Subjective: Pt reports that she was cleared to return to the gym and is doing well  Pt states that she attempted triceps pull down with cable column and experienced mild discomfort in L wrist toward thumb side  Objective: See treatment diary below    Assessment: Pt tolerated treatment well  Pt presented without increase in pain throughout all therex  Pt educated on how to properly complete wrist flexor/extensor stretching at home in order to minimize discomfort in L wrist  Pt's form with triceps pull down was also examined  Pt completed with good form without increase in pain  Plan: Progress treatment as tolerated            Precautions: Standard    Specialty Daily Treatment Diary       Manual 10/21 10/24 10/10/19 10/15/19 10/17/19   PROM wrist All planes All planes All planes All planes All planes   Wrist mobs Gr II-III Gr II-III Gr II-III Gr II-III Gr II-III   STM Retrograde Retrograde Retrograde Retrograde Retrograde                           Exercise Diary         AROM flex/ext 10x5" w/overpressure 10x5s  w/overpressure 10x5" w/overpressure 10x5" w/overpressure 10x5" w/overpressure   AROM rad/ ulnar dev  10x5s w/overpressure 10x5" w/overpressure 10x5" 10x5"   Gripper 20x5" green /10x5" ea digit red 20x5s green  10x5s ea digit red 10x5" /10x5" ea digit red 10x5" /10x5" ea digit red 20x5" green /10x5" ea digit red   Finger abd band        Wrist pro/sup 15x5" ea The stick 15x5s  The stick  15x5" ea green therabar 15x5" ea green therabar 15x5" ea green therabar   Wrist circles        Suitcase carry  6x50 ft  10# 4x50ft 7lbs 4x50ft 10 lbs 5x50ft 10 lbs   Therabar bends Pro/sup 15x5" ea green Pro/sup 15x5s ea green Pro/sup 15x5" ea Pro/sup 15x5" ea green Pro/sup 15x5" ea green   Wrist ext stretch   Table 3x10" Table 5x10"    Scaption/abd 2x10 ea 2lbs 2x10 ea 4#  2x10 ea 2lbs 2x10 ea 2lbs   Elbow flex 2x15 2lbs 2x15 4#  2x15 2lbs 2x15 2lbs   Tubing row    NV Yellow, Hold 5, 2x10    [place] triceps pull-down --> 10#  20x                                      Modalities        MHP NT  NT x8 mins pre x8 mins pre x8 mins pre

## 2019-10-29 ENCOUNTER — APPOINTMENT (OUTPATIENT)
Dept: PHYSICAL THERAPY | Facility: CLINIC | Age: 44
End: 2019-10-29
Payer: COMMERCIAL

## 2019-10-30 ENCOUNTER — TELEPHONE (OUTPATIENT)
Dept: OBGYN CLINIC | Facility: CLINIC | Age: 44
End: 2019-10-30

## 2019-10-30 NOTE — TELEPHONE ENCOUNTER
Patient called in regarding gym letter Dr Salma Gabriel had faxed at her last appointment  [facility] stated this was not received to the patient  I did refax the letter to [phone]   I did also scan the letter to [email or web address]    I called Gretel Grady and left a detailed message advising that I refaxed and emailed this letter to her  I did ask that she call me back to confirm that this letter was received I provided my direct ext

## 2019-10-31 ENCOUNTER — APPOINTMENT (OUTPATIENT)
Dept: PHYSICAL THERAPY | Facility: CLINIC | Age: 44
End: 2019-10-31
Payer: COMMERCIAL

## 2019-11-01 ENCOUNTER — APPOINTMENT (OUTPATIENT)
Dept: PHYSICAL THERAPY | Facility: CLINIC | Age: 44
End: 2019-11-01
Payer: COMMERCIAL

## 2019-11-05 ENCOUNTER — APPOINTMENT (OUTPATIENT)
Dept: PHYSICAL THERAPY | Facility: CLINIC | Age: 44
End: 2019-11-05
Payer: COMMERCIAL

## 2019-11-08 ENCOUNTER — OFFICE VISIT (OUTPATIENT)
Dept: PHYSICAL THERAPY | Facility: CLINIC | Age: 44
End: 2019-11-08
Payer: COMMERCIAL

## 2019-11-08 DIAGNOSIS — S52.532D CLOSED COLLES' FRACTURE OF LEFT RADIUS WITH ROUTINE HEALING, SUBSEQUENT ENCOUNTER: Primary | ICD-10-CM

## 2019-11-08 PROCEDURE — 97140 MANUAL THERAPY 1/> REGIONS: CPT

## 2019-11-08 PROCEDURE — 97110 THERAPEUTIC EXERCISES: CPT

## 2019-11-08 NOTE — PROGRESS NOTES
PT Discharge    Today's date: 2019  Patient name: Dari Ferrell  : 1975  MRN: [id number]  Referring provider: Stephen Whitman DO  Dx:   Encounter Diagnosis     ICD-10-CM    1  Closed Colles' fracture of left radius with routine healing, subsequent encounter S52 532D                   Assessment  Assessment details: Dari Ferrell is a 53 y o  female who has been seen in physical therapy for 8 visits secondary to the diagnosis of Closed Colles' fracture of left radius with routine healing, subsequent encounter  (primary encounter diagnosis) and is making steady gains towards established goals  The patient has demonstrated decreased pain level, improved ROM, and improved strength, which is evident in her FOTO increase from 50 to 69%  As a result, she is now able to perform her daily activities without any limitations  She was educated on continuing with her HEP to make steady gains to allow her to return to her recreational activities such as weight lifting  Patient agreed to continue with progressed HEP to make steady gains and be discharged from PT  Functional limitations: Unable to lift/carry with the L UEUnderstanding of Dx/Px/POC: good   Prognosis: good    Goals  Short Term Goals: Target Date 10/23/2019  1  Initiate and advance HEP -  MET  2  Increase left wrist AROM by at least 10 deg in all planes -MET  3  Increase left UE MMT by at least 1/2 grade - MET  4  Pt will be able to carry objects <5 lbs without increased pain -  MET      Long Term Goals: Target Date 2019  1  Indep with HEP - MET  2  Increase left wrist AROM to [facility] in all planes -MET  3  Increase left UE MMT to at least 4+/5 in all planes -MET  4  Pt will be able to carry objects </=15 lbs without increased pain - MET  5  Pt will be able to return to PLOF including resistance without pain or limitation - MET        Plan  Plan details: Discharge from PT with comprehensive HEP    Treatment plan discussed with: patient        Subjective Evaluation    History of Present Illness  Mechanism of injury: Pt reports she fell down three stairs on August 15 and landed on an outstretched L UE  She has a L Colles Fracture and was taken off splint 3 weeks ago  Pt feels that she is at 75%  She reports her pain and swelling has decreased and she is able to perform her ADLs without complaints  She notices fatigue and difficulty with gripping activities and is hesitant on returning to the gym  Quality of life: good    Pain  Current pain ratin  At best pain ratin  At worst pain ratin (Reaching/grabbing)  Location: L Wrist  Quality: dull ache  Relieving factors: rest and ice  Exacerbated by: Reacing, grabbing light objects  Progression: improved    Social Support    Employment status: working  Hand dominance: left      Diagnostic Tests  X-ray: abnormal (As above)        Objective     Tenderness     Left Wrist/Hand   Tenderness in the distal radioulnar joint       Active Range of Motion   Left Shoulder   Normal active range of motion    Right Shoulder   Normal active range of motion    Left Elbow   Normal active range of motion    Right Elbow   Normal active range of motion    Left Wrist   Wrist flexion: 40 degrees   Wrist extension: 50 degrees   Radial deviation: 30 degrees   Ulnar deviation: 25 degrees     Right Wrist   Normal active range of motion    Passive Range of Motion     Left Wrist   Normal passive range of motion    Right Wrist   Normal passive range of motion    Strength/Myotome Testing     Left Shoulder   Normal muscle strength    Right Shoulder   Normal muscle strength    Left Elbow   Flexion: 5  Extension: 5    Right Elbow   Flexion: 5  Extension: 5    Left Wrist/Hand   Wrist extension: 4  Wrist flexion: 4  Radial deviation: 4+  Ulnar deviation: 4+     (2nd hand position)     Trial 1: 41    Right Wrist/Hand      (2nd hand position)     Trial 1: 60      Flowsheet Rows      Most Recent Value   PT/OT G-Codes   Current Score  69   Projected Score  70   FOTO information reviewed  Yes             Precautions: Standard    Specialty Daily Treatment Diary       Manual 10/21 10/24 11/08/19  10/17/19   PROM wrist All planes All planes All planes  All planes   Wrist mobs Gr II-III Gr II-III Gr II-III  Gr II-III   STM Retrograde Retrograde Retrograde  Retrograde                           Exercise Diary         AROM flex/ext 10x5" w/overpressure 10x5s  w/overpressure 10x5" w/overpressure  10x5" w/overpressure   AROM rad/ ulnar dev  10x5s w/overpressure 10x5" w/overpressure  10x5"   Gripper 20x5" green /10x5" ea digit red 20x5s green  10x5s ea digit red 20x5" /10x5" ea digit green  20x5" green /10x5" ea digit red   Finger abd band        Wrist pro/sup 15x5" ea The stick 15x5s  The stick  15x5" ea green therabar  15x5" ea green therabar   Wrist circles        Suitcase carry  6x50 ft  10# 6x50ft 15#  5x50ft 10 lbs   Therabar bends Pro/sup 15x5" ea green Pro/sup 15x5s ea green Pro/sup 15x5" ea green  Pro/sup 15x5" ea green   Wrist ext stretch        Scaption/abd 2x10 ea 2lbs 2x10 ea 4# 2x10 ea 4#  2x10 ea 2lbs   Elbow flex 2x15 2lbs 2x15 4# 2x15 4#  2x15 2lbs   Tubing row     Yellow, Hold 5, 2x10    [place] triceps pull-down --> 10#  20x 10#  20x     Wrist flex/ext   2#, 2x10                             Modalities        MHP NT  NT x8 mins pre  x8 mins pre               Medbridge code: [facility]    Patient was co-treated by BILL Fitch under my direct supervision

## 2020-06-12 ENCOUNTER — EVALUATION (OUTPATIENT)
Dept: PHYSICAL THERAPY | Facility: CLINIC | Age: 45
End: 2020-06-12
Payer: COMMERCIAL

## 2020-06-12 DIAGNOSIS — S52.532D CLOSED COLLES' FRACTURE OF LEFT RADIUS WITH ROUTINE HEALING, SUBSEQUENT ENCOUNTER: Primary | ICD-10-CM

## 2020-06-12 PROCEDURE — 97161 PT EVAL LOW COMPLEX 20 MIN: CPT

## 2020-06-25 ENCOUNTER — APPOINTMENT (OUTPATIENT)
Dept: PHYSICAL THERAPY | Facility: CLINIC | Age: 45
End: 2020-06-25
Payer: COMMERCIAL

## 2020-07-02 ENCOUNTER — APPOINTMENT (OUTPATIENT)
Dept: PHYSICAL THERAPY | Facility: CLINIC | Age: 45
End: 2020-07-02
Payer: COMMERCIAL

## 2020-07-09 ENCOUNTER — OFFICE VISIT (OUTPATIENT)
Dept: PHYSICAL THERAPY | Facility: CLINIC | Age: 45
End: 2020-07-09
Payer: COMMERCIAL

## 2020-07-09 DIAGNOSIS — S52.532D CLOSED COLLES' FRACTURE OF LEFT RADIUS WITH ROUTINE HEALING, SUBSEQUENT ENCOUNTER: Primary | ICD-10-CM

## 2020-07-09 PROCEDURE — 97112 NEUROMUSCULAR REEDUCATION: CPT

## 2020-07-09 PROCEDURE — 97140 MANUAL THERAPY 1/> REGIONS: CPT

## 2020-07-09 PROCEDURE — 97110 THERAPEUTIC EXERCISES: CPT

## 2020-07-09 NOTE — PROGRESS NOTES
Daily Note     Today's date: 2020  Patient name: Cinthya Tyson  : 1975  MRN: [id number]  Referring provider: Christian Hou DO  Dx:   Encounter Diagnosis   Name Primary?  Closed Colles' fracture of left radius with routine healing, subsequent encounter Yes                  Subjective: Pt reports she has seen a difference the past couple of weeks performing exercises at home  States her only complaint at this time is that she can not perform a full push up  Denies any pain with ADLs or other functional activities  Pain in weight bearing increases to 3-4/10  Denies soreness after activity  Objective: See treatment diary below         Home Exercise Program [facility] access code: [email or web address]    Precautions standard              Manuals        PROM  Left wrist all directions      Mobilization   To increase supination and extension gr III-IV, distraction              Neuro Re-Ed         Quadruped weight shift 1x10 2x10      Shoulder taps Table 1x10 Table 1x10                                              Ther Ex        Table push ups  2x10      plank  20 sec 1x10      Thumb ext  Rubber band 1x10                                              Ther Activity                                                Modalities                                  Assessment: Pt tolerated treatment well with minimal complaints of pain  Instructed in plank and table push up mechanics and able to achieve pain free positioning  Pt educated about 30 day direct access limitation and instructed to get a follow up with orthopedics if she would like to continue with PT  Pt demonstrates 95% of wrist extension ROM and has 5/5 strength  Recommended pt continue with HEP and follow up as needed  Plan: Continue with plan of care to decrease pain, improve mobility, strength, and function

## 2020-09-14 ENCOUNTER — OFFICE VISIT (OUTPATIENT)
Dept: URGENT CARE | Facility: CLINIC | Age: 45
End: 2020-09-14
Payer: COMMERCIAL

## 2020-09-14 VITALS
DIASTOLIC BLOOD PRESSURE: 84 MMHG | SYSTOLIC BLOOD PRESSURE: 116 MMHG | BODY MASS INDEX: 22.4 KG/M2 | TEMPERATURE: 99.1 F | HEART RATE: 84 BPM | WEIGHT: 143 LBS | RESPIRATION RATE: 16 BRPM

## 2020-09-14 DIAGNOSIS — L23.9 ALLERGIC CONTACT DERMATITIS, UNSPECIFIED TRIGGER: ICD-10-CM

## 2020-09-14 DIAGNOSIS — S81.802A LEG WOUND, LEFT, INITIAL ENCOUNTER: Primary | ICD-10-CM

## 2020-09-14 PROBLEM — Z51.89 VISIT FOR WOUND CHECK: Status: ACTIVE | Noted: 2020-09-14

## 2020-09-14 PROBLEM — R22.32 MASS OF LEFT HAND: Status: ACTIVE | Noted: 2018-12-18

## 2020-09-14 PROBLEM — M72.0 DUPUYTREN'S DISEASE OF PALM OF BOTH HANDS: Status: ACTIVE | Noted: 2018-10-23

## 2020-09-14 PROCEDURE — 99213 OFFICE O/P EST LOW 20 MIN: CPT | Performed by: FAMILY MEDICINE

## 2020-09-14 RX ORDER — IVERMECTIN 10 MG/G
CREAM TOPICAL
COMMUNITY
Start: 2020-09-02

## 2020-09-14 NOTE — PATIENT INSTRUCTIONS
Wounds on left leg appear to be irritated related to inflammation possibly from an allergic reaction from the bandages or Neosporin  There is a rash surrounding the wounds which appears to be consistent with a contact dermatitis  There are no signs of wound infection/cellulitis  I have advised for the patient to discontinue the Neosporin and the band-aids, and I have instructed her to start using a topical 1% Hydrocortisone cream on the sites  She is to apply the cream sparingly to the affected areas twice daily and use for no longer than 1 week  She has been instructed to wash the sites gently with soap and water daily and pat dry  May cover with a gauze while outdoors  If symptoms persist despite treatment, worsen, or any new symptoms present, should be seen by PCP for follow up

## 2020-09-14 NOTE — PROGRESS NOTES
[id number]        NAME: Josi Corona is a 50 y o  female  : 1975    MRN: [id number]  DATE: 2020  TIME: 1:44 PM    Assessment and Plan   Leg wound, left, initial encounter [Y94 [id number]]  1  Leg wound, left, initial encounter     2  Allergic contact dermatitis, unspecified trigger           Patient Instructions     Patient Instructions   Wounds on left leg appear to be irritated related to inflammation possibly from an allergic reaction from the bandages or Neosporin  There is a rash surrounding the wounds which appears to be consistent with a contact dermatitis  There are no signs of wound infection/cellulitis  I have advised for the patient to discontinue the Neosporin and the band-aids, and I have instructed her to start using a topical 1% Hydrocortisone cream on the sites  She is to apply the cream sparingly to the affected areas twice daily and use for no longer than 1 week  She has been instructed to wash the sites gently with soap and water daily and pat dry  May cover with a gauze while outdoors  If symptoms persist despite treatment, worsen, or any new symptoms present, should be seen by PCP for follow up  Follow up with PCP in 3-5 days  Proceed to  ER if symptoms worsen  Chief Complaint     Chief Complaint   Patient presents with    bug bites and abrasions on left lower leg         History of Present Illness       Patient states she currently has 3 skin abrasions on her left lower leg related to bug bites and scraping against a bush  They have been present for about 10 days at this time  She states she has been treating them with Neosporin ointment, liquid band-aid, and fabric bandages  She states she has developed an itchy rash around the abrasions  She denies any drainage or oozing from the sites  She states the sites are itchy, but denies any pain  No surrounding redness, swelling, or bruising  No tick bites  No bull's eye rashes  No fever/chills   No body aches or joint pains  Review of Systems   Review of Systems   Constitutional: Negative  Respiratory: Negative  Cardiovascular: Negative  Gastrointestinal: Negative  Genitourinary: Negative  Musculoskeletal: Negative  Skin:        As noted in HPI   Neurological: Negative  Hematological: Negative  Current Medications       Current Outpatient Medications:     amphetamine-dextroamphetamine (ADDERALL XR) 20 MG 24 hr capsule, , Disp: , Rfl: 0    CRYSELLE-28 0 3-30 MG-MCG per tablet, Take 1 tablet by mouth daily, Disp: , Rfl: 3    FLUoxetine (PROzac) 10 mg capsule, Take 30 mg by mouth daily, Disp: , Rfl: 3    Soolantra 1 % CREA, JULIO EXT TO FACE D, Disp: , Rfl:     WELLBUTRIN  MG 24 hr tablet, , Disp: , Rfl: 0    Current Allergies     Allergies as of 09/14/2020 - Reviewed 09/14/2020   Allergen Reaction Noted    Dust mite extract  03/18/2013            The following portions of the patient's history were reviewed and updated as appropriate: allergies, current medications, past family history, past medical history, past social history, past surgical history and problem list      Past Medical History:   Diagnosis Date    ADHD (attention deficit hyperactivity disorder)     Depression        Past Surgical History:   Procedure Laterality Date    NO PAST SURGERIES         Family History   Problem Relation Age of Onset    Hypertension Mother     Diabetes Father     No Known Problems Sister     No Known Problems Brother     No Known Problems Maternal Aunt     No Known Problems Maternal Uncle     No Known Problems Paternal Aunt     No Known Problems Paternal Uncle     No Known Problems Maternal Grandmother     No Known Problems Maternal Grandfather     No Known Problems Paternal Grandmother     No Known Problems Paternal Grandfather          Medications have been verified          Objective   /84   Pulse 84   Temp 99 1 °F (37 3 °C)   Resp 16   Wt 64 9 kg (143 lb)   LMP 09/14/2020   BMI 22 40 kg/m²        Physical Exam     Physical Exam  Vitals signs and nursing note reviewed  Constitutional:       General: She is awake  She is not in acute distress  Appearance: Normal appearance  She is well-developed, well-groomed and normal weight  She is not ill-appearing, toxic-appearing or diaphoretic  Musculoskeletal: Normal range of motion  General: No swelling, tenderness or deformity  Right lower leg: No edema  Left lower leg: No edema  Skin:     General: Skin is warm  Capillary Refill: Capillary refill takes less than 2 seconds  Coloration: Skin is not cyanotic, mottled or pale  Findings: Rash and wound present  No abscess, bruising or ecchymosis  Comments: There are 3 small superficial abrasion wounds present on the left lower leg, anterior tibial region  Non-tender  No drainage/oozing  There is a rash surrounding the wounds which appears to consist of small erythematous papules and tiny vesicles  Pruritic to touch  Neurological:      Mental Status: She is alert and oriented to person, place, and time  Mental status is at baseline  Psychiatric:         Mood and Affect: Mood normal          Behavior: Behavior normal  Behavior is cooperative  Thought Content:  Thought content normal          Judgment: Judgment normal

## 2021-01-25 ENCOUNTER — OFFICE VISIT (OUTPATIENT)
Dept: OBGYN CLINIC | Facility: CLINIC | Age: 46
End: 2021-01-25
Payer: COMMERCIAL

## 2021-01-25 ENCOUNTER — APPOINTMENT (OUTPATIENT)
Dept: RADIOLOGY | Facility: CLINIC | Age: 46
End: 2021-01-25
Payer: COMMERCIAL

## 2021-01-25 VITALS
WEIGHT: 145 LBS | HEIGHT: 67 IN | BODY MASS INDEX: 22.76 KG/M2 | SYSTOLIC BLOOD PRESSURE: 152 MMHG | HEART RATE: 87 BPM | DIASTOLIC BLOOD PRESSURE: 96 MMHG

## 2021-01-25 DIAGNOSIS — M65.9 FCU (FLEXOR CARPI ULNARIS) TENOSYNOVITIS: Primary | ICD-10-CM

## 2021-01-25 DIAGNOSIS — M25.532 PAIN IN LEFT WRIST: ICD-10-CM

## 2021-01-25 PROCEDURE — 73110 X-RAY EXAM OF WRIST: CPT

## 2021-01-25 PROCEDURE — 99214 OFFICE O/P EST MOD 30 MIN: CPT | Performed by: ORTHOPAEDIC SURGERY

## 2021-01-25 NOTE — PROGRESS NOTES
Assessment/Plan:  1  FCU (flexor carpi ulnaris) tenosynovitis  XR wrist 3+ vw left       Adi Villanueva has left wrist pain consistent with flexor carpi ulnaris tenosynovitis  The slight lump in her wrist is more consistent with tendinopathy or tenosynovitis  I advised a cock-up wrist splint, ice, anti-inflammatories and rest from wrist extension exercises for the next 3-4 weeks  If the pain is persistent or worsens or the lump in large is we could consider ultrasound for further evaluation and consideration of ganglion cyst   I also gave her home exercises for strengthening and treatment of wrist tendinopathy  She verbalized understanding this plan will follow-up as needed  Subjective:   Regina Blackmon is a 50 y o  female who presents to the office for evaluation for left-sided wrist pain  She denies any fall or trauma to her wrist   She did have a history of wrist fracture in this wrist 1 year ago  She states she has been doing increased with work in [employer] with a  and has been doing planks and balance exercises on inverted [employer] ball  She feels when her wrist is extended discomfort over the ulnar aspect of her left wrist   She can feel a small lump in this area  It also bothers her with doing overhead weight exercises with her wrist back in extension  She feels the pain when she exercises  She does not feel at rest   The pain is mild and dull and intermittent  She denies any pain over the radial aspect her wrist where her fracture was  She denies any numbness or tingling in her hand  Review of Systems   Constitutional: Negative for chills, fever and unexpected weight change  HENT: Negative for hearing loss, nosebleeds and sore throat  Eyes: Negative for pain, redness and visual disturbance  Respiratory: Negative for cough, shortness of breath and wheezing  Cardiovascular: Negative for chest pain, palpitations and leg swelling     Gastrointestinal: Negative for abdominal pain, nausea and vomiting  Endocrine: Negative for polydipsia and polyuria  Genitourinary: Negative for dysuria and hematuria  Musculoskeletal:        See HPI   Skin: Negative for rash and wound  Neurological: Negative for dizziness, numbness and headaches  Psychiatric/Behavioral: Negative for decreased concentration and suicidal ideas  The patient is not nervous/anxious            Past Medical History:   Diagnosis Date    ADHD (attention deficit hyperactivity disorder)     Depression        Past Surgical History:   Procedure Laterality Date    NO PAST SURGERIES         Family History   Problem Relation Age of Onset    Hypertension Mother     Diabetes Father     No Known Problems Sister     No Known Problems Brother     No Known Problems Maternal Aunt     No Known Problems Maternal Uncle     No Known Problems Paternal Aunt     No Known Problems Paternal Uncle     No Known Problems Maternal Grandmother     No Known Problems Maternal Grandfather     No Known Problems Paternal Grandmother     No Known Problems Paternal Grandfather        Social History     Occupational History    Not on file   Tobacco Use    Smoking status: Never Smoker    Smokeless tobacco: Never Used   Substance and Sexual Activity    Alcohol use: Yes     Frequency: Monthly or less     Drinks per session: 1 or 2    Drug use: Never    Sexual activity: Not on file         Current Outpatient Medications:     amphetamine-dextroamphetamine (ADDERALL XR) 20 MG 24 hr capsule, , Disp: , Rfl: 0    CRYSELLE-28 0 3-30 MG-MCG per tablet, Take 1 tablet by mouth daily, Disp: , Rfl: 3    FLUoxetine (PROzac) 10 mg capsule, Take 30 mg by mouth daily, Disp: , Rfl: 3    Soolantra 1 % CREA, JULIO EXT TO FACE D, Disp: , Rfl:     WELLBUTRIN  MG 24 hr tablet, , Disp: , Rfl: 0    Allergies   Allergen Reactions    Dust Mite Extract        Objective:  Vitals:    01/25/21 0959   BP: 152/96   Pulse: 87       Right Hand Exam     Tenderness   Right hand tenderness location:        Left Hand Exam     Tenderness   Left hand tenderness location: Tenderness to palpation over flexor carpi ulnaris tendon  Range of Motion   Wrist   Extension: normal   Flexion: normal   Pronation: normal   Supination: normal     Muscle Strength   Wrist extension: 5/5   Wrist flexion: 5/5   :  5/5     Tests   Finkelstein's test: negative    Other   Erythema: absent  Sensation: normal  Pulse: present    Comments:  Slight enlargement of FCU tendon consistent with tendinosis  Could possibly be related to small ganglion cyst   Slight discomfort over FCU with resisted supination          Strength/Myotome Testing     Left Wrist/Hand   Wrist extension: 5  Wrist flexion: 5    Tests     Left Wrist/Hand   Negative Finkelstein's  Physical Exam  Vitals signs reviewed  Constitutional:       Appearance: She is well-developed  HENT:      Head: Normocephalic and atraumatic  Eyes:      Conjunctiva/sclera: Conjunctivae normal       Pupils: Pupils are equal, round, and reactive to light  Neck:      Musculoskeletal: Normal range of motion and neck supple  Cardiovascular:      Rate and Rhythm: Normal rate  Pulmonary:      Effort: Pulmonary effort is normal  No respiratory distress  Skin:     General: Skin is warm and dry  Neurological:      Mental Status: She is alert and oriented to person, place, and time  Psychiatric:         Behavior: Behavior normal          I have personally reviewed pertinent films in PACS and my interpretation is as follows:   Three-view x-ray of the left wrist in the office today demonstrates no evidence of acute abnormality